# Patient Record
Sex: FEMALE | Race: OTHER | HISPANIC OR LATINO
[De-identification: names, ages, dates, MRNs, and addresses within clinical notes are randomized per-mention and may not be internally consistent; named-entity substitution may affect disease eponyms.]

---

## 2020-03-03 ENCOUNTER — APPOINTMENT (OUTPATIENT)
Dept: ENDOCRINOLOGY | Facility: CLINIC | Age: 70
End: 2020-03-03
Payer: MEDICAID

## 2020-03-03 ENCOUNTER — RESULT CHARGE (OUTPATIENT)
Age: 70
End: 2020-03-03

## 2020-03-03 VITALS — HEIGHT: 62 IN | BODY MASS INDEX: 33.13 KG/M2 | WEIGHT: 180 LBS

## 2020-03-03 DIAGNOSIS — E11.9 TYPE 2 DIABETES MELLITUS W/OUT COMPLICATIONS: ICD-10-CM

## 2020-03-03 LAB — HBA1C MFR BLD HPLC: 9.3

## 2020-03-03 PROCEDURE — 95251 CONT GLUC MNTR ANALYSIS I&R: CPT

## 2020-03-03 PROCEDURE — 95250 CONT GLUC MNTR PHYS/QHP EQP: CPT

## 2020-03-03 PROCEDURE — G0108 DIAB MANAGE TRN  PER INDIV: CPT

## 2020-03-03 RX ORDER — ELECTROLYTES/DEXTROSE
32G X 4 MM SOLUTION, ORAL ORAL
Qty: 2 | Refills: 0 | Status: ACTIVE | COMMUNITY
Start: 2020-03-03 | End: 1900-01-01

## 2020-05-12 ENCOUNTER — APPOINTMENT (OUTPATIENT)
Dept: ENDOCRINOLOGY | Facility: CLINIC | Age: 70
End: 2020-05-12

## 2020-08-04 RX ORDER — INSULIN ASPART 100 [IU]/ML
100 INJECTION, SOLUTION INTRAVENOUS; SUBCUTANEOUS
Qty: 3 | Refills: 0 | Status: ACTIVE | COMMUNITY
Start: 2020-03-03 | End: 1900-01-01

## 2023-10-21 ENCOUNTER — EMERGENCY (EMERGENCY)
Facility: HOSPITAL | Age: 73
LOS: 1 days | Discharge: HOME | End: 2023-10-21
Attending: EMERGENCY MEDICINE | Admitting: EMERGENCY MEDICINE
Payer: MEDICARE

## 2023-10-21 VITALS
HEART RATE: 78 BPM | SYSTOLIC BLOOD PRESSURE: 137 MMHG | OXYGEN SATURATION: 98 % | RESPIRATION RATE: 14 BRPM | TEMPERATURE: 98 F | DIASTOLIC BLOOD PRESSURE: 56 MMHG

## 2023-10-21 VITALS
TEMPERATURE: 98 F | SYSTOLIC BLOOD PRESSURE: 128 MMHG | OXYGEN SATURATION: 98 % | RESPIRATION RATE: 18 BRPM | DIASTOLIC BLOOD PRESSURE: 54 MMHG | HEART RATE: 80 BPM

## 2023-10-21 LAB
ALBUMIN SERPL ELPH-MCNC: 4.1 G/DL — SIGNIFICANT CHANGE UP (ref 3.3–5)
ALBUMIN SERPL ELPH-MCNC: 4.1 G/DL — SIGNIFICANT CHANGE UP (ref 3.3–5)
ALP SERPL-CCNC: 76 U/L — SIGNIFICANT CHANGE UP (ref 40–120)
ALP SERPL-CCNC: 76 U/L — SIGNIFICANT CHANGE UP (ref 40–120)
ALT FLD-CCNC: 40 U/L — HIGH (ref 4–33)
ALT FLD-CCNC: 40 U/L — HIGH (ref 4–33)
ANION GAP SERPL CALC-SCNC: 16 MMOL/L — HIGH (ref 7–14)
ANION GAP SERPL CALC-SCNC: 16 MMOL/L — HIGH (ref 7–14)
APPEARANCE UR: CLEAR — SIGNIFICANT CHANGE UP
APPEARANCE UR: CLEAR — SIGNIFICANT CHANGE UP
AST SERPL-CCNC: 50 U/L — HIGH (ref 4–32)
AST SERPL-CCNC: 50 U/L — HIGH (ref 4–32)
B PERT DNA SPEC QL NAA+PROBE: SIGNIFICANT CHANGE UP
B PERT DNA SPEC QL NAA+PROBE: SIGNIFICANT CHANGE UP
B PERT+PARAPERT DNA PNL SPEC NAA+PROBE: SIGNIFICANT CHANGE UP
B PERT+PARAPERT DNA PNL SPEC NAA+PROBE: SIGNIFICANT CHANGE UP
BACTERIA # UR AUTO: NEGATIVE /HPF — SIGNIFICANT CHANGE UP
BACTERIA # UR AUTO: NEGATIVE /HPF — SIGNIFICANT CHANGE UP
BASOPHILS # BLD AUTO: 0.04 K/UL — SIGNIFICANT CHANGE UP (ref 0–0.2)
BASOPHILS # BLD AUTO: 0.04 K/UL — SIGNIFICANT CHANGE UP (ref 0–0.2)
BASOPHILS NFR BLD AUTO: 0.6 % — SIGNIFICANT CHANGE UP (ref 0–2)
BASOPHILS NFR BLD AUTO: 0.6 % — SIGNIFICANT CHANGE UP (ref 0–2)
BILIRUB SERPL-MCNC: 0.3 MG/DL — SIGNIFICANT CHANGE UP (ref 0.2–1.2)
BILIRUB SERPL-MCNC: 0.3 MG/DL — SIGNIFICANT CHANGE UP (ref 0.2–1.2)
BILIRUB UR-MCNC: NEGATIVE — SIGNIFICANT CHANGE UP
BILIRUB UR-MCNC: NEGATIVE — SIGNIFICANT CHANGE UP
BORDETELLA PARAPERTUSSIS (RAPRVP): SIGNIFICANT CHANGE UP
BORDETELLA PARAPERTUSSIS (RAPRVP): SIGNIFICANT CHANGE UP
BUN SERPL-MCNC: 17 MG/DL — SIGNIFICANT CHANGE UP (ref 7–23)
BUN SERPL-MCNC: 17 MG/DL — SIGNIFICANT CHANGE UP (ref 7–23)
C PNEUM DNA SPEC QL NAA+PROBE: SIGNIFICANT CHANGE UP
C PNEUM DNA SPEC QL NAA+PROBE: SIGNIFICANT CHANGE UP
CALCIUM SERPL-MCNC: 10 MG/DL — SIGNIFICANT CHANGE UP (ref 8.4–10.5)
CALCIUM SERPL-MCNC: 10 MG/DL — SIGNIFICANT CHANGE UP (ref 8.4–10.5)
CAST: 5 /LPF — HIGH (ref 0–4)
CAST: 5 /LPF — HIGH (ref 0–4)
CHLORIDE SERPL-SCNC: 100 MMOL/L — SIGNIFICANT CHANGE UP (ref 98–107)
CHLORIDE SERPL-SCNC: 100 MMOL/L — SIGNIFICANT CHANGE UP (ref 98–107)
CO2 SERPL-SCNC: 23 MMOL/L — SIGNIFICANT CHANGE UP (ref 22–31)
CO2 SERPL-SCNC: 23 MMOL/L — SIGNIFICANT CHANGE UP (ref 22–31)
COLOR SPEC: YELLOW — SIGNIFICANT CHANGE UP
COLOR SPEC: YELLOW — SIGNIFICANT CHANGE UP
CREAT SERPL-MCNC: 0.64 MG/DL — SIGNIFICANT CHANGE UP (ref 0.5–1.3)
CREAT SERPL-MCNC: 0.64 MG/DL — SIGNIFICANT CHANGE UP (ref 0.5–1.3)
DIFF PNL FLD: NEGATIVE — SIGNIFICANT CHANGE UP
DIFF PNL FLD: NEGATIVE — SIGNIFICANT CHANGE UP
EGFR: 92 ML/MIN/1.73M2 — SIGNIFICANT CHANGE UP
EGFR: 92 ML/MIN/1.73M2 — SIGNIFICANT CHANGE UP
EOSINOPHIL # BLD AUTO: 0.26 K/UL — SIGNIFICANT CHANGE UP (ref 0–0.5)
EOSINOPHIL # BLD AUTO: 0.26 K/UL — SIGNIFICANT CHANGE UP (ref 0–0.5)
EOSINOPHIL NFR BLD AUTO: 4 % — SIGNIFICANT CHANGE UP (ref 0–6)
EOSINOPHIL NFR BLD AUTO: 4 % — SIGNIFICANT CHANGE UP (ref 0–6)
FLUAV SUBTYP SPEC NAA+PROBE: SIGNIFICANT CHANGE UP
FLUAV SUBTYP SPEC NAA+PROBE: SIGNIFICANT CHANGE UP
FLUBV RNA SPEC QL NAA+PROBE: SIGNIFICANT CHANGE UP
FLUBV RNA SPEC QL NAA+PROBE: SIGNIFICANT CHANGE UP
GLUCOSE SERPL-MCNC: 190 MG/DL — HIGH (ref 70–99)
GLUCOSE SERPL-MCNC: 190 MG/DL — HIGH (ref 70–99)
GLUCOSE UR QL: NEGATIVE MG/DL — SIGNIFICANT CHANGE UP
GLUCOSE UR QL: NEGATIVE MG/DL — SIGNIFICANT CHANGE UP
HADV DNA SPEC QL NAA+PROBE: SIGNIFICANT CHANGE UP
HADV DNA SPEC QL NAA+PROBE: SIGNIFICANT CHANGE UP
HCOV 229E RNA SPEC QL NAA+PROBE: SIGNIFICANT CHANGE UP
HCOV 229E RNA SPEC QL NAA+PROBE: SIGNIFICANT CHANGE UP
HCOV HKU1 RNA SPEC QL NAA+PROBE: SIGNIFICANT CHANGE UP
HCOV HKU1 RNA SPEC QL NAA+PROBE: SIGNIFICANT CHANGE UP
HCOV NL63 RNA SPEC QL NAA+PROBE: SIGNIFICANT CHANGE UP
HCOV NL63 RNA SPEC QL NAA+PROBE: SIGNIFICANT CHANGE UP
HCOV OC43 RNA SPEC QL NAA+PROBE: SIGNIFICANT CHANGE UP
HCOV OC43 RNA SPEC QL NAA+PROBE: SIGNIFICANT CHANGE UP
HCT VFR BLD CALC: 32.5 % — LOW (ref 34.5–45)
HCT VFR BLD CALC: 32.5 % — LOW (ref 34.5–45)
HGB BLD-MCNC: 9.9 G/DL — LOW (ref 11.5–15.5)
HGB BLD-MCNC: 9.9 G/DL — LOW (ref 11.5–15.5)
HMPV RNA SPEC QL NAA+PROBE: SIGNIFICANT CHANGE UP
HMPV RNA SPEC QL NAA+PROBE: SIGNIFICANT CHANGE UP
HPIV1 RNA SPEC QL NAA+PROBE: SIGNIFICANT CHANGE UP
HPIV1 RNA SPEC QL NAA+PROBE: SIGNIFICANT CHANGE UP
HPIV2 RNA SPEC QL NAA+PROBE: SIGNIFICANT CHANGE UP
HPIV2 RNA SPEC QL NAA+PROBE: SIGNIFICANT CHANGE UP
HPIV3 RNA SPEC QL NAA+PROBE: SIGNIFICANT CHANGE UP
HPIV3 RNA SPEC QL NAA+PROBE: SIGNIFICANT CHANGE UP
HPIV4 RNA SPEC QL NAA+PROBE: SIGNIFICANT CHANGE UP
HPIV4 RNA SPEC QL NAA+PROBE: SIGNIFICANT CHANGE UP
IANC: 3.31 K/UL — SIGNIFICANT CHANGE UP (ref 1.8–7.4)
IANC: 3.31 K/UL — SIGNIFICANT CHANGE UP (ref 1.8–7.4)
IMM GRANULOCYTES NFR BLD AUTO: 1.2 % — HIGH (ref 0–0.9)
IMM GRANULOCYTES NFR BLD AUTO: 1.2 % — HIGH (ref 0–0.9)
KETONES UR-MCNC: NEGATIVE MG/DL — SIGNIFICANT CHANGE UP
KETONES UR-MCNC: NEGATIVE MG/DL — SIGNIFICANT CHANGE UP
LEUKOCYTE ESTERASE UR-ACNC: ABNORMAL
LEUKOCYTE ESTERASE UR-ACNC: ABNORMAL
LYMPHOCYTES # BLD AUTO: 2.36 K/UL — SIGNIFICANT CHANGE UP (ref 1–3.3)
LYMPHOCYTES # BLD AUTO: 2.36 K/UL — SIGNIFICANT CHANGE UP (ref 1–3.3)
LYMPHOCYTES # BLD AUTO: 35.9 % — SIGNIFICANT CHANGE UP (ref 13–44)
LYMPHOCYTES # BLD AUTO: 35.9 % — SIGNIFICANT CHANGE UP (ref 13–44)
M PNEUMO DNA SPEC QL NAA+PROBE: SIGNIFICANT CHANGE UP
M PNEUMO DNA SPEC QL NAA+PROBE: SIGNIFICANT CHANGE UP
MCHC RBC-ENTMCNC: 23.1 PG — LOW (ref 27–34)
MCHC RBC-ENTMCNC: 23.1 PG — LOW (ref 27–34)
MCHC RBC-ENTMCNC: 30.5 GM/DL — LOW (ref 32–36)
MCHC RBC-ENTMCNC: 30.5 GM/DL — LOW (ref 32–36)
MCV RBC AUTO: 75.9 FL — LOW (ref 80–100)
MCV RBC AUTO: 75.9 FL — LOW (ref 80–100)
MONOCYTES # BLD AUTO: 0.52 K/UL — SIGNIFICANT CHANGE UP (ref 0–0.9)
MONOCYTES # BLD AUTO: 0.52 K/UL — SIGNIFICANT CHANGE UP (ref 0–0.9)
MONOCYTES NFR BLD AUTO: 7.9 % — SIGNIFICANT CHANGE UP (ref 2–14)
MONOCYTES NFR BLD AUTO: 7.9 % — SIGNIFICANT CHANGE UP (ref 2–14)
NEUTROPHILS # BLD AUTO: 3.31 K/UL — SIGNIFICANT CHANGE UP (ref 1.8–7.4)
NEUTROPHILS # BLD AUTO: 3.31 K/UL — SIGNIFICANT CHANGE UP (ref 1.8–7.4)
NEUTROPHILS NFR BLD AUTO: 50.4 % — SIGNIFICANT CHANGE UP (ref 43–77)
NEUTROPHILS NFR BLD AUTO: 50.4 % — SIGNIFICANT CHANGE UP (ref 43–77)
NITRITE UR-MCNC: NEGATIVE — SIGNIFICANT CHANGE UP
NITRITE UR-MCNC: NEGATIVE — SIGNIFICANT CHANGE UP
NRBC # BLD: 0 /100 WBCS — SIGNIFICANT CHANGE UP (ref 0–0)
NRBC # BLD: 0 /100 WBCS — SIGNIFICANT CHANGE UP (ref 0–0)
NRBC # FLD: 0 K/UL — SIGNIFICANT CHANGE UP (ref 0–0)
NRBC # FLD: 0 K/UL — SIGNIFICANT CHANGE UP (ref 0–0)
PH UR: 6.5 — SIGNIFICANT CHANGE UP (ref 5–8)
PH UR: 6.5 — SIGNIFICANT CHANGE UP (ref 5–8)
PLATELET # BLD AUTO: 230 K/UL — SIGNIFICANT CHANGE UP (ref 150–400)
PLATELET # BLD AUTO: 230 K/UL — SIGNIFICANT CHANGE UP (ref 150–400)
POTASSIUM SERPL-MCNC: 4.3 MMOL/L — SIGNIFICANT CHANGE UP (ref 3.5–5.3)
POTASSIUM SERPL-MCNC: 4.3 MMOL/L — SIGNIFICANT CHANGE UP (ref 3.5–5.3)
POTASSIUM SERPL-SCNC: 4.3 MMOL/L — SIGNIFICANT CHANGE UP (ref 3.5–5.3)
POTASSIUM SERPL-SCNC: 4.3 MMOL/L — SIGNIFICANT CHANGE UP (ref 3.5–5.3)
PROT SERPL-MCNC: 7.7 G/DL — SIGNIFICANT CHANGE UP (ref 6–8.3)
PROT SERPL-MCNC: 7.7 G/DL — SIGNIFICANT CHANGE UP (ref 6–8.3)
PROT UR-MCNC: NEGATIVE MG/DL — SIGNIFICANT CHANGE UP
PROT UR-MCNC: NEGATIVE MG/DL — SIGNIFICANT CHANGE UP
RAPID RVP RESULT: SIGNIFICANT CHANGE UP
RAPID RVP RESULT: SIGNIFICANT CHANGE UP
RBC # BLD: 4.28 M/UL — SIGNIFICANT CHANGE UP (ref 3.8–5.2)
RBC # BLD: 4.28 M/UL — SIGNIFICANT CHANGE UP (ref 3.8–5.2)
RBC # FLD: 18.4 % — HIGH (ref 10.3–14.5)
RBC # FLD: 18.4 % — HIGH (ref 10.3–14.5)
RBC CASTS # UR COMP ASSIST: 0 /HPF — SIGNIFICANT CHANGE UP (ref 0–4)
RBC CASTS # UR COMP ASSIST: 0 /HPF — SIGNIFICANT CHANGE UP (ref 0–4)
RSV RNA SPEC QL NAA+PROBE: SIGNIFICANT CHANGE UP
RSV RNA SPEC QL NAA+PROBE: SIGNIFICANT CHANGE UP
RV+EV RNA SPEC QL NAA+PROBE: SIGNIFICANT CHANGE UP
RV+EV RNA SPEC QL NAA+PROBE: SIGNIFICANT CHANGE UP
SARS-COV-2 RNA SPEC QL NAA+PROBE: SIGNIFICANT CHANGE UP
SARS-COV-2 RNA SPEC QL NAA+PROBE: SIGNIFICANT CHANGE UP
SODIUM SERPL-SCNC: 139 MMOL/L — SIGNIFICANT CHANGE UP (ref 135–145)
SODIUM SERPL-SCNC: 139 MMOL/L — SIGNIFICANT CHANGE UP (ref 135–145)
SP GR SPEC: 1.01 — SIGNIFICANT CHANGE UP (ref 1–1.03)
SP GR SPEC: 1.01 — SIGNIFICANT CHANGE UP (ref 1–1.03)
SQUAMOUS # UR AUTO: 1 /HPF — SIGNIFICANT CHANGE UP (ref 0–5)
SQUAMOUS # UR AUTO: 1 /HPF — SIGNIFICANT CHANGE UP (ref 0–5)
UROBILINOGEN FLD QL: 0.2 MG/DL — SIGNIFICANT CHANGE UP (ref 0.2–1)
UROBILINOGEN FLD QL: 0.2 MG/DL — SIGNIFICANT CHANGE UP (ref 0.2–1)
WBC # BLD: 6.57 K/UL — SIGNIFICANT CHANGE UP (ref 3.8–10.5)
WBC # BLD: 6.57 K/UL — SIGNIFICANT CHANGE UP (ref 3.8–10.5)
WBC # FLD AUTO: 6.57 K/UL — SIGNIFICANT CHANGE UP (ref 3.8–10.5)
WBC # FLD AUTO: 6.57 K/UL — SIGNIFICANT CHANGE UP (ref 3.8–10.5)
WBC UR QL: 2 /HPF — SIGNIFICANT CHANGE UP (ref 0–5)
WBC UR QL: 2 /HPF — SIGNIFICANT CHANGE UP (ref 0–5)

## 2023-10-21 PROCEDURE — 93010 ELECTROCARDIOGRAM REPORT: CPT

## 2023-10-21 PROCEDURE — 71046 X-RAY EXAM CHEST 2 VIEWS: CPT | Mod: 26

## 2023-10-21 PROCEDURE — 99284 EMERGENCY DEPT VISIT MOD MDM: CPT | Mod: GC

## 2023-10-21 RX ORDER — IPRATROPIUM/ALBUTEROL SULFATE 18-103MCG
3 AEROSOL WITH ADAPTER (GRAM) INHALATION
Refills: 0 | Status: DISCONTINUED | OUTPATIENT
Start: 2023-10-21 | End: 2023-10-25

## 2023-10-21 RX ADMIN — Medication 100 MILLIGRAM(S): at 15:30

## 2023-10-21 RX ADMIN — Medication 3 MILLILITER(S): at 15:50

## 2023-10-21 NOTE — ED PROVIDER NOTE - NSICDXPASTSURGICALHX_GEN_ALL_CORE_FT
PAST SURGICAL HISTORY:  Other postprocedural status umbilical surgery    Other postprocedural status for treatment of diverticulitis    Status post total hysterectomy at 19 years old

## 2023-10-21 NOTE — ED PROVIDER NOTE - CLINICAL SUMMARY MEDICAL DECISION MAKING FREE TEXT BOX
73-year-old female history of diabetes, , HTN, HLD presenting to the ED for 10 days of cough and began feeling bilateral lower extremity weakness for 1 day but has been ambulating normally.  Noted she had fevers and chills last week but those have resolved and patient continues to have cough.  Denying any chest pain, shortness of breath, nausea, vomiting.  Denying any belly pain no dysuria.  No lower extremity swelling.   we will get chest x-ray to evaluate for pneumonia.  Also evaluate for electrolyte abnormalities versus UTI for patient's lower extremity weakness.

## 2023-10-21 NOTE — ED PROVIDER NOTE - NSICDXPASTMEDICALHX_GEN_ALL_CORE_FT
PAST MEDICAL HISTORY:  Atherosclerosis of coronary artery Coronary artery disease    Essential hypertension Hypertension    Hypercholesterolemia Hypercholesterolemia    Type 2 diabetes mellitus Diabetes

## 2023-10-21 NOTE — ED ADULT TRIAGE NOTE - CHIEF COMPLAINT QUOTE
Patient c/o a cough x 10 days and bilateral leg weakness since yesterday. diabetes type 2  FS = 202  Patient denies any fevers or chills.

## 2023-10-21 NOTE — ED ADULT TRIAGE NOTE - MODE OF ARRIVAL
Roxana from [employer] is here to reprogram pacemaker.  Patient to front lobby via ambulation with her daughter.   Walk in

## 2023-10-21 NOTE — ED PROVIDER NOTE - PROGRESS NOTE DETAILS
Flaquito Phillip, PGY3 Patient feels significant improvement after getting nebs and Tessalon Perles.  Labs show no acute findings.  Patient is well-appearing at bedside and would like to go home.  Will discharge on Tessalon Perles

## 2023-10-21 NOTE — ED ADULT NURSE NOTE - NSFALLUNIVINTERV_ED_ALL_ED
Bed/Stretcher in lowest position, wheels locked, appropriate side rails in place/Call bell, personal items and telephone in reach/Instruct patient to call for assistance before getting out of bed/chair/stretcher/Non-slip footwear applied when patient is off stretcher/[place] to call system/Physically safe environment - no spills, clutter or unnecessary equipment/Purposeful proactive rounding/Room/bathroom lighting operational, light cord in reach

## 2023-10-21 NOTE — ED PROVIDER NOTE - NSFOLLOWUPINSTRUCTIONS_ED_ALL_ED_FT
It was a pleasure caring for you today.  Please make sure to stay hydrated and get plenty of rest.  Please make sure to follow-up with your primary care doctor in the next 2 to 3 days for follow-up care.  Please return to the hospital if you have any new or worsening symptoms including chest pain, shortness of breath, dizziness, passing out.      You were sent a medication called Tessalon Perles to your pharmacy.  You may take this for cough.  Please make sure to not operate heavy machinery as medication as it can cause sleepiness or drowsiness.

## 2023-10-21 NOTE — ED PROVIDER NOTE - NSICDXFAMILYHX_GEN_ALL_CORE_FT
FAMILY HISTORY:  Family history of cardiovascular disease, alive; 64 years old  Family history of ischemic heart disease,  at 69 from MI

## 2023-10-21 NOTE — ED PROVIDER NOTE - PATIENT PORTAL LINK FT
You can access the FollowMyHealth Patient Portal offered by [facility] by registering at the following website: [email or web address]. By joining [facility]’s FollowMyHealth portal, you will also be able to view your health information using other applications (apps) compatible with our system.

## 2023-10-21 NOTE — ED PROVIDER NOTE - PHYSICAL EXAMINATION
Const: Well-nourished, Well-developed, appearing stated age.  Eyes: no conjunctival injection, and symmetrical lids.  HEENT: Head NCAT, no lesions. Atraumatic external nose and ears. Moist MM.  Neck: Symmetric, trachea midline.   CVS: +S1/S2, Peripheral pulses 2+ and equal in all extremities.  RESP: Unlabored respiratory effort. Left lower rales  GI: Nontender/Nondistended   MSK: Normocephalic/Atraumatic, Lower Extremities w/o calf tenderness or edema b/l.  patient ambulating in the ED bathroom  Skin: Warm, dry and intact.   Neuro: CNs II-XII grossly intact. Motor & Sensation grossly intact.  Psych: Awake, Alert, & Oriented (AAO) x3. Appropriate mood and affect.

## 2023-10-21 NOTE — ED ADULT NURSE REASSESSMENT NOTE - NS ED NURSE REASSESS COMMENT FT1
break coverage rn. pt A&Ox4, amb at baseline. endorses partial symptom improvement post receiving 2x duoneb treatments. states breathing is better, but cough slightly persisting. Awaiting xray results. safety maintained. resp even unlabored, no distress noted.

## 2023-10-21 NOTE — ED ADULT NURSE NOTE - OBJECTIVE STATEMENT
break coverage rn. pt A&Ox4, amb at baseline [place] speaking only. Pmh of HTN, HLD, DM2. Pt c/o chest discomfort r/t cough for 10x days. states approximately 1x week ago had fevers/chills, has since resolved. Son at bedside states pt feels like she has phlegm but unable to cough up anything. Has not seen PCP or urgent care for symptoms. Denies chest pain, n/v, headache, dizziness, diarrhea,n/v,numbness/tingling to hands/feet. 20G to IVANA, labs and urine sent, medicated awaiting xray

## 2023-10-21 NOTE — ED ADULT NURSE NOTE - PAIN: BODY LOCATION
What Type Of Note Output Would You Prefer (Optional)?: Standard Output How Severe Is Your Skin Lesion?: mild Has Your Skin Lesion Been Treated?: not been treated Is This A New Presentation, Or A Follow-Up?: Mole throat discomfort

## 2023-10-21 NOTE — ED PROVIDER NOTE - ATTENDING CONTRIBUTION TO CARE
73-year-old female history of diabetes, , HTN, HLD presenting to the ED for 10 days of cough and began feeling bilateral lower extremity weakness for 1 day but has been ambulating normally.  Noted she had fevers and chills last week but those have resolved and patient continues to have cough.  Denying any chest pain, shortness of breath, nausea, vomiting.  Denying any belly pain no dysuria.  No lower extremity swelling.

## 2023-10-21 NOTE — ED ADULT TRIAGE NOTE - AS TEMP SITE
SUBJECTIVE  Chief Complaint   Patient presents with    ADHD    Anxiety       HPI This child is with mom. This young lady has been thoroughly evaluated by her counselor and is thought to have anxiety related to ADHD. Both mom and child are interested in trying an ADHD medicine to see if it will help her focus and in turn help her anxiety. Review of Systems   Constitutional: Negative for appetite change, fatigue and fever. HENT: Negative for ear pain and sore throat. Eyes: Negative for discharge. Respiratory: Negative for cough. Gastrointestinal: Negative for abdominal pain, constipation, diarrhea, nausea and vomiting. Genitourinary: Negative for dysuria and urgency. Skin: Negative for rash. Neurological: Negative for headaches. Psychiatric/Behavioral: Positive for decreased concentration. Negative for behavioral problems. The patient is not hyperactive. All other systems reviewed and are negative. Past Medical History:   Diagnosis Date    Anxiety 6/11/2018    Dysthymia 9/21/2020    Early satiety 9/28/2020    Episode of recurrent major depressive disorder ([facility] [address] 8/5/2019    Family history of thyroid disease in mother 6/11/2018    Gastroesophageal reflux disease without esophagitis 10/15/2020    Motion sickness 6/11/2018       History reviewed. No pertinent family history. No Known Allergies    OBJECTIVE  Physical Exam  Constitutional:       Appearance: She is well-developed. HENT:      Nose: Nose normal.   Eyes:      Pupils: Pupils are equal, round, and reactive to light. Comments: Good red reflex   Neck:      Thyroid: No thyromegaly. Cardiovascular:      Rate and Rhythm: Normal rate. Heart sounds: Normal heart sounds. No murmur heard. Pulmonary:      Effort: Pulmonary effort is normal.      Breath sounds: Normal breath sounds. Abdominal:      General: Bowel sounds are normal.      Palpations: Abdomen is soft. There is no mass.    Musculoskeletal: Cervical back: Normal range of motion. Lymphadenopathy:      Cervical: No cervical adenopathy. Skin:     Findings: No rash. Neurological:      Mental Status: She is alert. Psychiatric:         Judgment: Judgment normal.         ASSESSMENT    ICD-10-CM    1. Attention deficit hyperactivity disorder (ADHD), predominantly inattentive type  F90.0 methylphenidate (CONCERTA) 18 MG extended release tablet        PLAN  After explaining side effects of stimulant medicine to include tic disorder, personality change, anorexia, headache, and paradoxical worsening of her focus we have elected to start her on Concerta 18 mg and I will recheck her 2 weeks    Emery Suazo MD    More than 50% of the time was spent counseling and coordinating care for a total time of greater than 20 min.     (Please note that portions of this note were completed with a voice recognition program.  Effortswere made to edit the dictations but occasionally words are mis-transcribed.) oral

## 2023-10-22 LAB
CULTURE RESULTS: SIGNIFICANT CHANGE UP
CULTURE RESULTS: SIGNIFICANT CHANGE UP
SPECIMEN SOURCE: SIGNIFICANT CHANGE UP
SPECIMEN SOURCE: SIGNIFICANT CHANGE UP

## 2024-03-16 ENCOUNTER — INPATIENT (INPATIENT)
Facility: HOSPITAL | Age: 74
LOS: 3 days | Discharge: HOME | End: 2024-03-20
Attending: INTERNAL MEDICINE | Admitting: INTERNAL MEDICINE
Payer: MEDICARE

## 2024-03-16 VITALS
DIASTOLIC BLOOD PRESSURE: 74 MMHG | RESPIRATION RATE: 22 BRPM | HEART RATE: 108 BPM | TEMPERATURE: 98 F | SYSTOLIC BLOOD PRESSURE: 146 MMHG | OXYGEN SATURATION: 97 %

## 2024-03-16 PROCEDURE — 99285 EMERGENCY DEPT VISIT HI MDM: CPT

## 2024-03-16 NOTE — ED ADULT TRIAGE NOTE - CHIEF COMPLAINT QUOTE
pt aox4, ambulatory with steady gait comes in for worsening cough and exertions sob over the last 3 weeks. pt was given inhaler with no relief. pt denies fevers, no chest pain. sent by PMD for CXR, pt also has script for CT scan. hx: NIDDM, HTN

## 2024-03-17 DIAGNOSIS — I10 ESSENTIAL (PRIMARY) HYPERTENSION: ICD-10-CM

## 2024-03-17 DIAGNOSIS — J10.1 INFLUENZA DUE TO OTHER IDENTIFIED INFLUENZA VIRUS WITH OTHER RESPIRATORY MANIFESTATIONS: ICD-10-CM

## 2024-03-17 DIAGNOSIS — E78.5 HYPERLIPIDEMIA, UNSPECIFIED: ICD-10-CM

## 2024-03-17 DIAGNOSIS — E11.9 TYPE 2 DIABETES MELLITUS WITHOUT COMPLICATIONS: ICD-10-CM

## 2024-03-17 DIAGNOSIS — Z29.9 ENCOUNTER FOR PROPHYLACTIC MEASURES, UNSPECIFIED: ICD-10-CM

## 2024-03-17 DIAGNOSIS — I48.91 UNSPECIFIED ATRIAL FIBRILLATION: ICD-10-CM

## 2024-03-17 LAB
A1C WITH ESTIMATED AVERAGE GLUCOSE RESULT: 7.1 % — HIGH (ref 4–5.6)
ALBUMIN SERPL ELPH-MCNC: 4 G/DL — SIGNIFICANT CHANGE UP (ref 3.3–5)
ALBUMIN SERPL ELPH-MCNC: 4.1 G/DL — SIGNIFICANT CHANGE UP (ref 3.3–5)
ALP SERPL-CCNC: 77 U/L — SIGNIFICANT CHANGE UP (ref 40–120)
ALP SERPL-CCNC: 78 U/L — SIGNIFICANT CHANGE UP (ref 40–120)
ALT FLD-CCNC: 38 U/L — HIGH (ref 4–33)
ALT FLD-CCNC: 43 U/L — HIGH (ref 4–33)
ANION GAP SERPL CALC-SCNC: 18 MMOL/L — HIGH (ref 7–14)
ANION GAP SERPL CALC-SCNC: 20 MMOL/L — HIGH (ref 7–14)
AST SERPL-CCNC: 30 U/L — SIGNIFICANT CHANGE UP (ref 4–32)
AST SERPL-CCNC: 44 U/L — HIGH (ref 4–32)
BASE EXCESS BLDV CALC-SCNC: -1 MMOL/L — SIGNIFICANT CHANGE UP (ref -2–3)
BASOPHILS # BLD AUTO: 0.03 K/UL — SIGNIFICANT CHANGE UP (ref 0–0.2)
BASOPHILS NFR BLD AUTO: 0.4 % — SIGNIFICANT CHANGE UP (ref 0–2)
BILIRUB SERPL-MCNC: 0.3 MG/DL — SIGNIFICANT CHANGE UP (ref 0.2–1.2)
BILIRUB SERPL-MCNC: 0.4 MG/DL — SIGNIFICANT CHANGE UP (ref 0.2–1.2)
BUN SERPL-MCNC: 15 MG/DL — SIGNIFICANT CHANGE UP (ref 7–23)
BUN SERPL-MCNC: 17 MG/DL — SIGNIFICANT CHANGE UP (ref 7–23)
CA-I SERPL-SCNC: 1.28 MMOL/L — SIGNIFICANT CHANGE UP (ref 1.15–1.33)
CALCIUM SERPL-MCNC: 9.6 MG/DL — SIGNIFICANT CHANGE UP (ref 8.4–10.5)
CALCIUM SERPL-MCNC: 9.8 MG/DL — SIGNIFICANT CHANGE UP (ref 8.4–10.5)
CHLORIDE BLDV-SCNC: 101 MMOL/L — SIGNIFICANT CHANGE UP (ref 96–108)
CHLORIDE SERPL-SCNC: 94 MMOL/L — LOW (ref 98–107)
CHLORIDE SERPL-SCNC: 98 MMOL/L — SIGNIFICANT CHANGE UP (ref 98–107)
CO2 BLDV-SCNC: 24.5 MMOL/L — SIGNIFICANT CHANGE UP (ref 22–26)
CO2 SERPL-SCNC: 19 MMOL/L — LOW (ref 22–31)
CO2 SERPL-SCNC: 22 MMOL/L — SIGNIFICANT CHANGE UP (ref 22–31)
CREAT SERPL-MCNC: 0.66 MG/DL — SIGNIFICANT CHANGE UP (ref 0.5–1.3)
CREAT SERPL-MCNC: 0.71 MG/DL — SIGNIFICANT CHANGE UP (ref 0.5–1.3)
EGFR: 88 ML/MIN/1.73M2 — SIGNIFICANT CHANGE UP
EGFR: 91 ML/MIN/1.73M2 — SIGNIFICANT CHANGE UP
EOSINOPHIL # BLD AUTO: 0.11 K/UL — SIGNIFICANT CHANGE UP (ref 0–0.5)
EOSINOPHIL NFR BLD AUTO: 1.3 % — SIGNIFICANT CHANGE UP (ref 0–6)
ESTIMATED AVERAGE GLUCOSE: 157 — SIGNIFICANT CHANGE UP
FLUAV AG NPH QL: DETECTED
FLUBV AG NPH QL: SIGNIFICANT CHANGE UP
GAS PNL BLDV: 134 MMOL/L — LOW (ref 136–145)
GAS PNL BLDV: SIGNIFICANT CHANGE UP
GLUCOSE BLDV-MCNC: 110 MG/DL — HIGH (ref 70–99)
GLUCOSE SERPL-MCNC: 114 MG/DL — HIGH (ref 70–99)
GLUCOSE SERPL-MCNC: 260 MG/DL — HIGH (ref 70–99)
HCO3 BLDV-SCNC: 23 MMOL/L — SIGNIFICANT CHANGE UP (ref 22–29)
HCT VFR BLD CALC: 30 % — LOW (ref 34.5–45)
HCT VFR BLDA CALC: 16 % — CRITICAL LOW (ref 34.5–46.5)
HGB BLD CALC-MCNC: 5.3 G/DL — CRITICAL LOW (ref 11.7–16.1)
HGB BLD-MCNC: 9 G/DL — LOW (ref 11.5–15.5)
IANC: 4.25 K/UL — SIGNIFICANT CHANGE UP (ref 1.8–7.4)
IMM GRANULOCYTES NFR BLD AUTO: 0.6 % — SIGNIFICANT CHANGE UP (ref 0–0.9)
LACTATE BLDV-MCNC: 4.7 MMOL/L — CRITICAL HIGH (ref 0.5–2)
LACTATE SERPL-SCNC: 4.5 MMOL/L — CRITICAL HIGH (ref 0.5–2)
LYMPHOCYTES # BLD AUTO: 3.46 K/UL — HIGH (ref 1–3.3)
LYMPHOCYTES # BLD AUTO: 40.6 % — SIGNIFICANT CHANGE UP (ref 13–44)
MCHC RBC-ENTMCNC: 22.8 PG — LOW (ref 27–34)
MCHC RBC-ENTMCNC: 30 GM/DL — LOW (ref 32–36)
MCV RBC AUTO: 75.9 FL — LOW (ref 80–100)
MONOCYTES # BLD AUTO: 0.62 K/UL — SIGNIFICANT CHANGE UP (ref 0–0.9)
MONOCYTES NFR BLD AUTO: 7.3 % — SIGNIFICANT CHANGE UP (ref 2–14)
NEUTROPHILS # BLD AUTO: 4.25 K/UL — SIGNIFICANT CHANGE UP (ref 1.8–7.4)
NEUTROPHILS NFR BLD AUTO: 49.8 % — SIGNIFICANT CHANGE UP (ref 43–77)
NRBC # BLD: 0 /100 WBCS — SIGNIFICANT CHANGE UP (ref 0–0)
NRBC # FLD: 0 K/UL — SIGNIFICANT CHANGE UP (ref 0–0)
NT-PROBNP SERPL-SCNC: 361 PG/ML — HIGH
PCO2 BLDV: 36 MMHG — LOW (ref 39–52)
PH BLDV: 7.42 — SIGNIFICANT CHANGE UP (ref 7.32–7.43)
PLATELET # BLD AUTO: 223 K/UL — SIGNIFICANT CHANGE UP (ref 150–400)
PO2 BLDV: 115 MMHG — HIGH (ref 25–45)
POTASSIUM BLDV-SCNC: 4.1 MMOL/L — SIGNIFICANT CHANGE UP (ref 3.5–5.1)
POTASSIUM SERPL-MCNC: 3.9 MMOL/L — SIGNIFICANT CHANGE UP (ref 3.5–5.3)
POTASSIUM SERPL-MCNC: 4.4 MMOL/L — SIGNIFICANT CHANGE UP (ref 3.5–5.3)
POTASSIUM SERPL-SCNC: 3.9 MMOL/L — SIGNIFICANT CHANGE UP (ref 3.5–5.3)
POTASSIUM SERPL-SCNC: 4.4 MMOL/L — SIGNIFICANT CHANGE UP (ref 3.5–5.3)
PROCALCITONIN SERPL-MCNC: 0.09 NG/ML — SIGNIFICANT CHANGE UP (ref 0.02–0.1)
PROT SERPL-MCNC: 7.8 G/DL — SIGNIFICANT CHANGE UP (ref 6–8.3)
PROT SERPL-MCNC: 7.9 G/DL — SIGNIFICANT CHANGE UP (ref 6–8.3)
RBC # BLD: 3.95 M/UL — SIGNIFICANT CHANGE UP (ref 3.8–5.2)
RBC # FLD: 18.9 % — HIGH (ref 10.3–14.5)
RSV RNA NPH QL NAA+NON-PROBE: SIGNIFICANT CHANGE UP
SAO2 % BLDV: 97.4 % — HIGH (ref 67–88)
SARS-COV-2 RNA SPEC QL NAA+PROBE: SIGNIFICANT CHANGE UP
SODIUM SERPL-SCNC: 133 MMOL/L — LOW (ref 135–145)
SODIUM SERPL-SCNC: 138 MMOL/L — SIGNIFICANT CHANGE UP (ref 135–145)
WBC # BLD: 8.52 K/UL — SIGNIFICANT CHANGE UP (ref 3.8–10.5)
WBC # FLD AUTO: 8.52 K/UL — SIGNIFICANT CHANGE UP (ref 3.8–10.5)

## 2024-03-17 PROCEDURE — 99223 1ST HOSP IP/OBS HIGH 75: CPT

## 2024-03-17 PROCEDURE — 99284 EMERGENCY DEPT VISIT MOD MDM: CPT

## 2024-03-17 PROCEDURE — 71046 X-RAY EXAM CHEST 2 VIEWS: CPT | Mod: 26

## 2024-03-17 PROCEDURE — 71275 CT ANGIOGRAPHY CHEST: CPT | Mod: 26,MC

## 2024-03-17 RX ORDER — SIMVASTATIN 20 MG/1
20 TABLET, FILM COATED ORAL AT BEDTIME
Refills: 0 | Status: DISCONTINUED | OUTPATIENT
Start: 2024-03-17 | End: 2024-03-20

## 2024-03-17 RX ORDER — SODIUM CHLORIDE 9 MG/ML
1000 INJECTION, SOLUTION INTRAVENOUS
Refills: 0 | Status: DISCONTINUED | OUTPATIENT
Start: 2024-03-17 | End: 2024-03-20

## 2024-03-17 RX ORDER — DEXTROSE 50 % IN WATER 50 %
12.5 SYRINGE (ML) INTRAVENOUS ONCE
Refills: 0 | Status: DISCONTINUED | OUTPATIENT
Start: 2024-03-17 | End: 2024-03-20

## 2024-03-17 RX ORDER — DEXTROSE 50 % IN WATER 50 %
15 SYRINGE (ML) INTRAVENOUS ONCE
Refills: 0 | Status: DISCONTINUED | OUTPATIENT
Start: 2024-03-17 | End: 2024-03-20

## 2024-03-17 RX ORDER — SODIUM CHLORIDE 9 MG/ML
1000 INJECTION INTRAMUSCULAR; INTRAVENOUS; SUBCUTANEOUS ONCE
Refills: 0 | Status: COMPLETED | OUTPATIENT
Start: 2024-03-17 | End: 2024-03-17

## 2024-03-17 RX ORDER — ASPIRIN/CALCIUM CARB/MAGNESIUM 324 MG
0 TABLET ORAL
Refills: 0 | DISCHARGE

## 2024-03-17 RX ORDER — DIPHENHYDRAMINE HCL 50 MG
50 CAPSULE ORAL ONCE
Refills: 0 | Status: COMPLETED | OUTPATIENT
Start: 2024-03-17 | End: 2024-03-17

## 2024-03-17 RX ORDER — INSULIN LISPRO 100/ML
VIAL (ML) SUBCUTANEOUS AT BEDTIME
Refills: 0 | Status: DISCONTINUED | OUTPATIENT
Start: 2024-03-17 | End: 2024-03-20

## 2024-03-17 RX ORDER — DILTIAZEM HCL 120 MG
20 CAPSULE, EXT RELEASE 24 HR ORAL ONCE
Refills: 0 | Status: COMPLETED | OUTPATIENT
Start: 2024-03-17 | End: 2024-03-17

## 2024-03-17 RX ORDER — OMEPRAZOLE 10 MG/1
1 CAPSULE, DELAYED RELEASE ORAL
Refills: 0 | DISCHARGE

## 2024-03-17 RX ORDER — INSULIN GLARGINE 100 [IU]/ML
50 INJECTION, SOLUTION SUBCUTANEOUS
Refills: 0 | DISCHARGE

## 2024-03-17 RX ORDER — METOPROLOL TARTRATE 50 MG
25 TABLET ORAL
Refills: 0 | Status: DISCONTINUED | OUTPATIENT
Start: 2024-03-17 | End: 2024-03-20

## 2024-03-17 RX ORDER — INSULIN LISPRO 100/ML
VIAL (ML) SUBCUTANEOUS
Refills: 0 | Status: DISCONTINUED | OUTPATIENT
Start: 2024-03-17 | End: 2024-03-20

## 2024-03-17 RX ORDER — INSULIN LISPRO 100/ML
15 VIAL (ML) SUBCUTANEOUS
Refills: 0 | Status: DISCONTINUED | OUTPATIENT
Start: 2024-03-17 | End: 2024-03-20

## 2024-03-17 RX ORDER — INSULIN ASPART 100 [IU]/ML
20 INJECTION, SOLUTION SUBCUTANEOUS
Refills: 0 | DISCHARGE

## 2024-03-17 RX ORDER — DEXTROSE 50 % IN WATER 50 %
25 SYRINGE (ML) INTRAVENOUS ONCE
Refills: 0 | Status: DISCONTINUED | OUTPATIENT
Start: 2024-03-17 | End: 2024-03-20

## 2024-03-17 RX ORDER — PANTOPRAZOLE SODIUM 20 MG/1
40 TABLET, DELAYED RELEASE ORAL
Refills: 0 | Status: DISCONTINUED | OUTPATIENT
Start: 2024-03-17 | End: 2024-03-20

## 2024-03-17 RX ORDER — INSULIN GLARGINE 100 [IU]/ML
50 INJECTION, SOLUTION SUBCUTANEOUS AT BEDTIME
Refills: 0 | Status: DISCONTINUED | OUTPATIENT
Start: 2024-03-17 | End: 2024-03-20

## 2024-03-17 RX ORDER — APIXABAN 2.5 MG/1
5 TABLET, FILM COATED ORAL EVERY 12 HOURS
Refills: 0 | Status: DISCONTINUED | OUTPATIENT
Start: 2024-03-17 | End: 2024-03-20

## 2024-03-17 RX ORDER — ISOSORBIDE MONONITRATE 60 MG/1
1 TABLET, EXTENDED RELEASE ORAL
Refills: 0 | DISCHARGE

## 2024-03-17 RX ORDER — ASPIRIN/CALCIUM CARB/MAGNESIUM 324 MG
81 TABLET ORAL DAILY
Refills: 0 | Status: DISCONTINUED | OUTPATIENT
Start: 2024-03-17 | End: 2024-03-20

## 2024-03-17 RX ORDER — APIXABAN 2.5 MG/1
5 TABLET, FILM COATED ORAL ONCE
Refills: 0 | Status: COMPLETED | OUTPATIENT
Start: 2024-03-17 | End: 2024-03-17

## 2024-03-17 RX ORDER — SIMVASTATIN 20 MG/1
1 TABLET, FILM COATED ORAL
Refills: 0 | DISCHARGE

## 2024-03-17 RX ORDER — LOSARTAN POTASSIUM 100 MG/1
100 TABLET, FILM COATED ORAL DAILY
Refills: 0 | Status: DISCONTINUED | OUTPATIENT
Start: 2024-03-17 | End: 2024-03-20

## 2024-03-17 RX ORDER — DILTIAZEM HCL 120 MG
30 CAPSULE, EXT RELEASE 24 HR ORAL ONCE
Refills: 0 | Status: COMPLETED | OUTPATIENT
Start: 2024-03-17 | End: 2024-03-17

## 2024-03-17 RX ORDER — METFORMIN HYDROCHLORIDE 850 MG/1
1 TABLET ORAL
Refills: 0 | DISCHARGE

## 2024-03-17 RX ORDER — GLUCAGON INJECTION, SOLUTION 0.5 MG/.1ML
1 INJECTION, SOLUTION SUBCUTANEOUS ONCE
Refills: 0 | Status: DISCONTINUED | OUTPATIENT
Start: 2024-03-17 | End: 2024-03-20

## 2024-03-17 RX ORDER — LORATADINE 10 MG/1
1 TABLET ORAL
Refills: 0 | DISCHARGE

## 2024-03-17 RX ORDER — ISOSORBIDE MONONITRATE 60 MG/1
30 TABLET, EXTENDED RELEASE ORAL DAILY
Refills: 0 | Status: DISCONTINUED | OUTPATIENT
Start: 2024-03-17 | End: 2024-03-20

## 2024-03-17 RX ADMIN — Medication 200 MILLIGRAM(S): at 12:07

## 2024-03-17 RX ADMIN — Medication 15 UNIT(S): at 17:46

## 2024-03-17 RX ADMIN — Medication 1 TABLET(S): at 19:13

## 2024-03-17 RX ADMIN — Medication 2: at 17:46

## 2024-03-17 RX ADMIN — SIMVASTATIN 20 MILLIGRAM(S): 20 TABLET, FILM COATED ORAL at 22:16

## 2024-03-17 RX ADMIN — APIXABAN 5 MILLIGRAM(S): 2.5 TABLET, FILM COATED ORAL at 06:56

## 2024-03-17 RX ADMIN — SODIUM CHLORIDE 1000 MILLILITER(S): 9 INJECTION INTRAMUSCULAR; INTRAVENOUS; SUBCUTANEOUS at 03:05

## 2024-03-17 RX ADMIN — Medication 50 MILLIGRAM(S): at 04:28

## 2024-03-17 RX ADMIN — ISOSORBIDE MONONITRATE 30 MILLIGRAM(S): 60 TABLET, EXTENDED RELEASE ORAL at 12:06

## 2024-03-17 RX ADMIN — Medication 3: at 15:01

## 2024-03-17 RX ADMIN — Medication 25 MILLIGRAM(S): at 22:16

## 2024-03-17 RX ADMIN — Medication 100 MILLIGRAM(S): at 06:56

## 2024-03-17 RX ADMIN — Medication 20 MILLIGRAM(S): at 04:25

## 2024-03-17 RX ADMIN — LOSARTAN POTASSIUM 100 MILLIGRAM(S): 100 TABLET, FILM COATED ORAL at 12:07

## 2024-03-17 RX ADMIN — PANTOPRAZOLE SODIUM 40 MILLIGRAM(S): 20 TABLET, DELAYED RELEASE ORAL at 11:17

## 2024-03-17 RX ADMIN — Medication 81 MILLIGRAM(S): at 12:06

## 2024-03-17 RX ADMIN — Medication 75 MILLIGRAM(S): at 06:57

## 2024-03-17 RX ADMIN — APIXABAN 5 MILLIGRAM(S): 2.5 TABLET, FILM COATED ORAL at 19:13

## 2024-03-17 RX ADMIN — Medication 30 MILLIGRAM(S): at 05:26

## 2024-03-17 RX ADMIN — Medication 75 MILLIGRAM(S): at 19:13

## 2024-03-17 RX ADMIN — Medication 40 MILLIGRAM(S): at 01:11

## 2024-03-17 RX ADMIN — INSULIN GLARGINE 50 UNIT(S): 100 INJECTION, SOLUTION SUBCUTANEOUS at 22:16

## 2024-03-17 RX ADMIN — Medication 25 MILLIGRAM(S): at 11:16

## 2024-03-17 NOTE — ED PROVIDER NOTE - ADDITIONAL PROGRESS NOTE...
Additional Progress Note... High Dose Vitamin A Counseling: Side effects reviewed, pt to contact office should one occur.

## 2024-03-17 NOTE — ED ADULT NURSE REASSESSMENT NOTE - NS ED NURSE REASSESS COMMENT FT1
ER report taking from night shift, pt AOX 3 son at bed side, coming with non prod. cough, SOB, pt was Rapid A. Fib on triage EKG and con't on CM with Hr 120-131b/min, pt denies CP, no dizziness,   Tachypneic 22-23h/min, Oxygen Sat. 95-96 RA.  + Influenza A,   repeat CBC as per lab. was not found.      Susie Soriano RN

## 2024-03-17 NOTE — ED PROVIDER NOTE - CARE PLAN
1 Principal Discharge DX:	Cough  Secondary Diagnosis:	SOB (shortness of breath)   Principal Discharge DX:	Atrial fibrillation  Secondary Diagnosis:	SOB (shortness of breath)

## 2024-03-17 NOTE — H&P ADULT - PROBLEM SELECTOR PLAN 4
- Pt takes Lantus 50u + Novolog 20u  - Check A1C   - - Pt takes Lantus 50u + Novolog 20u at home   - Check A1C   - Hold metformin

## 2024-03-17 NOTE — H&P ADULT - PROBLEM SELECTOR PLAN 3
- Monitor BP   - Continue home mes Telmisartan, Amlodipine, HCTZ and Imdur. Adding Metoprolol as per cardiology recs

## 2024-03-17 NOTE — ED ADULT NURSE NOTE - NSFALLUNIVINTERV_ED_ALL_ED
Bed/Stretcher in lowest position, wheels locked, appropriate side rails in place/Call bell, personal items and telephone in reach/Instruct patient to call for assistance before getting out of bed/chair/stretcher/Non-slip footwear applied when patient is off stretcher/[place] to call system/Physically safe environment - no spills, clutter or unnecessary equipment/Purposeful proactive rounding/Room/bathroom lighting operational, light cord in reach

## 2024-03-17 NOTE — ED PROVIDER NOTE - PROGRESS NOTE DETAILS
Garry, PGY3 - Labs nonactionable, patient has influenza A.  Will wait for CTA, patient currently retirement through her premedication regimen. MD CHO:  Pt newly tachycardic.  -150, irreg irreg.  EKG demonstrates new rapid afib.  No cp, no sob, ctabil, no change to bp or mentation.  Will give ivf bolus, rate control with cardizem. Garry, PGY3 - Was alerted by nurse that patient's heart rate had jumped to the 150s, reassessed patient at bedside with  Maggie #[id number].  Patient states that she continues to feel the same intensity of symptoms as when she first arrived to the emergency room.  Repeat EKG shows A-fib with RVR, heart rate fluctuating between 120 and 150.  Did POCUS ultrasound which did not show pericardial effusion, concern for possible enlarged right ventricle.  Patient has received Benadryl, will get CAT scan in less than an hour. Patient had not received IV fluids that were ordered, current IV not working, will get a second IV and start fluids with Cardizem. Garry, PGY3 - Was alerted by nurse that patient's heart rate had jumped to the 150s, reassessed patient at bedside with  Maggie #[id number].  Patient states that she continues to feel the same intensity of symptoms as when she first arrived to the emergency room.  Repeat EKG shows A-fib with RVR, heart rate fluctuating between 120 and 150.  Did POCUS ultrasound which did not show pericardial effusion, concern for possible enlarged right ventricle.  Patient to receive Benadryl, will get CAT scan in less than an hour. Patient had not received IV fluids that were ordered, current IV not working, will get a second IV and start fluids with Cardizem. Garry, PGY3 - Repeat EKG shows improvement in heart rate, heart rate now usually around 120s.  Spoke to patient with  Zane #[id number], aware and in agreement with starting Eliquis and being admitted to the hospital.  Spoke to hospitalist Mariaa, excepting patient for A-fib with new RVR in the setting of flu a Garry, PGY3 - Spoke to cardio NP Raul, took down patient information, will let cards fellow know about consult for afib with rvr md cho:  elevated lactate level

## 2024-03-17 NOTE — ED PROVIDER NOTE - ATTENDING CONTRIBUTION TO CARE
DR. SANDOVAL, ATTENDING MD-  I performed a face to face bedside interview with the patient regarding history of present illness, review of symptoms and past medical history. I completed an independent physical exam.  I have discussed the patient's plan of care with the resident.   Documentation as above in the note.    75 y/o female c/o sob prod cough x3 wks.  Eval for pna viral syndrome pe anemia lyte abn.  Obtain ekg cbc cmp trop cxr ctpa viral swab reassess.

## 2024-03-17 NOTE — H&P ADULT - ASSESSMENT
73 yo F with h/o HTN, DM, HLD presenting with cough and shortness of breath x 2 weeks.  75 yo F with h/o HTN, DM, HLD presenting with cough and shortness of breath x 2 weeks. Found to be flu positive. Also complicated by A fib with RVR

## 2024-03-17 NOTE — ED PROCEDURE NOTE - ATTENDING CONTRIBUTION TO CARE
MD SANDOVAL:  I was present for the critical portions of this procedure and provided direct attending supervision.

## 2024-03-17 NOTE — ED PROVIDER NOTE - PHYSICAL EXAMINATION
Gen: mild distress, AOx3, able to make needs known, non-toxic  Head: NCAT  HEENT: EOMI, normal conjunctiva  Lung: CTAB, mild respiratory distress, 100% on RA, no wheezes/rhonchi/rales B/L, speaking in full sentences  CV: RRR, no M/R/G, pulses bilaterally. Equal bilateral lower extremities  Abd: soft, NTND, no guarding, no CVA tenderness  MSK: no visible bony deformities  Neuro: No focal sensory or motor deficits  Skin: Warm, well perfused, no rash  Psych: uncomfortable affect

## 2024-03-17 NOTE — ED ADULT NURSE NOTE - OBJECTIVE STATEMENT
74 year old female brought to room 13. Patient alert and oriented times four. Patient ambulatory at baseline. patients son at bedside translating for patient. pt aox4, ambulatory with steady gait comes in for worsening cough and exertions sob over the last 3 weeks. pt was given inhaler with no relief. pt denies fevers, no chest pain. sent by PMD for CXR, pt also has script for CT scan. hx: NIDDM, HTN. MD at bedside completing eval. awaiting further orders   patient laying in semi fowlers position on the stretcher. Patient normal sinus on the monitor. pulse oximetry at 98 percent. Respirations equal and adequate. Safety measures in place, call bell within reach. Patient stable upon leaving the room.

## 2024-03-17 NOTE — CONSULT NOTE ADULT - SUBJECTIVE AND OBJECTIVE BOX
Patient seen and evaluated at bedside    Chief Complaint: cough    HPI:  75 y/o female with PMH HTN, IDDM, HLD, GERD, CAD (s/p 5 stents, most recent a few years ago, only on ASA now), presenting with worsening cough.     Cough started about 3 weeks ago, improved slightly after 2 weeks, and then acutely worsened on 3/16 overnight which prompted her to come to ED. The cough is productive of light-colored sputum. She has some mild SOB while she is coughing, but otherwise no SOB at rest. No CP. No sensation of palpitations. No other major symptoms.    Med list: Insulin, metformin, PPI, ASA 81, amlodipine 5, telmisartan 80, HCTZ 12.5, simvastatin 20, Imdur 30. Atenolol was recently stopped by outpatient cardiologist at [facility].     Cardiology consulted for AF with RVR. AF appears new, although her records are not here, but her medication list does not include AC.    Patient appears well, comfortable, speaking full sentences, mild cough during exam. Denies CP or SOB.    EKG shows AF with RVR to 130s, iRBBB, LAFB, and borderline LVH by AVL criteria.  Pro-BNP 300s, CE not ordered. Lactate 4.7 --> 5.3. Flu positive. VBG normal. Hgb 9 (similar to prior). WBC 8.5. Cr 0.66.  CXR shows b/l prominent bronchovascular marking c/w pulmonary vascular congestion.  CTA PE shows no PE, and possibly mild pulmonary edema vs. fibrotic process    No TTE in the system since .     She was started on Eliquis and so far received 20mg IV dilt + 30mg PO dilt, as well as 40mg IV methylpred and Tamiflu.      PMHx:   Atherosclerosis of coronary artery  Type 2 diabetes mellitus  Hypercholesterolemia  Essential hypertension    PSHx:   Other postprocedural status  Other postprocedural status  Status post total hysterectomy    Allergies:  IV Contrast (Other)      FAMILY HISTORY:  Family history of cardiovascular disease  alive; 64 years old    Family history of ischemic heart disease   at 69 from MI    REVIEW OF SYSTEMS:   All other review of systems is negative unless indicated above.    Physical Exam:  T(F): 98.8 (-17), Max: 98.8 (-17)  HR: 120 (-17) (101 - 162)  BP: 140/72 (03-17) (140/70 - 164/64)  RR: 22 ()  SpO2: 96% ()  GENERAL: No acute distress, well-developed  CHEST/LUNG: b/l mild rales throughout, no wheeze, no crackles  HEART: Tachycardic, irregular; No murmurs, rubs, or gallops  ABDOMEN: Soft, Nontender, Nondistended  EXTREMITIES:  No edema  NEUROLOGY: AAOx3    CXR: Personally reviewed    Labs: Personally reviewed                        9.0    8.52  )-----------( 223      ( 17 Mar 2024 01:31 )             30.0         133<L>  |  94<L>  |  17  ----------------------------<  114<H>  3.9   |  19<L>  |  0.66    Ca    9.8      17 Mar 2024 01:31    TPro  7.8  /  Alb  4.0  /  TBili  0.3  /  DBili  x   /  AST  44<H>  /  ALT  43<H>  /  AlkPhos  77

## 2024-03-17 NOTE — ED ADULT NURSE REASSESSMENT NOTE - NS ED NURSE REASSESS COMMENT FT1
patient laying in semi fowlers position on the stretcher. patient alert and oriented times four. patient denies shortness of breath, chest pain, nausea, vomiting, chill, fever. Patient tachy sinus on the monitor. Respirations equal and adequate. Safety measures in place, call bell within reach. Patient stable upon assessment.

## 2024-03-17 NOTE — H&P ADULT - HISTORY OF PRESENT ILLNESS
73 yo F with h/o HTN, DM, HLD presenting with cough and shortness of breath x 2 weeks . Pt reports having a persistent cough that has gotten worse. Denies any fevers, no sick contacts or recent travel       In ED pt was given  73 yo F with h/o HTN, DM, HLD presenting with cough and shortness of breath x 2 weeks. Pt reports having a persistent cough that has gotten worse. Denies any fevers, no sick contacts or recent travel. Pt states she was given cough medicine and an inhaler by her pcp but this has not helped. In ED pt was found to be in A fib with RVR, HR in 130s. Denies h/o A fib. She denies any chest pain or palpitations, no orthopnea or leg swelling.     In ED pt was given Diltiazem, Eliquis, Imdur, Losartan, Solumedrol   VS:  129/68  131  98.1  18  95% on RA

## 2024-03-17 NOTE — ED ADULT NURSE REASSESSMENT NOTE - NS ED NURSE REASSESS COMMENT FT1
BREAK RN: Pt received from DEZ Kerr. Pt placed on cardiac monitor, HR up to 165 BPM. MD Adler at bedside for assessment. EKG completed showing afib with RVR. Pt tachypneic and SOB, O2  on room air. Respirations even, chest rise and fall equal b/l. Left wrist 20 placed, +blood return. Pt medicated as ordered. Pt pending CTA. Handoff given to primary DEZ Kerr. Pt safety maintained.

## 2024-03-17 NOTE — H&P ADULT - PROBLEM SELECTOR PLAN 1
- In setting of the flu  - CHADS VASC 6  - Cont Eliquis, metoprolol  - Monitor on tele   - TTE ordered - In setting of the flu  - CHADS VASC 6  - Cont Eliquis, metoprolol  - Monitor on tele   - TTE ordered  - Check TSH

## 2024-03-17 NOTE — ED PROVIDER NOTE - CLINICAL SUMMARY MEDICAL DECISION MAKING FREE TEXT BOX
Garry, PGY3 -  74-year-old woman presenting due to worsening cough, shortness of breath with exertion over the last 3 weeks. patient does not have PE risk factors.  Consider viral URI versus pneumonia versus anemia however does not have any blood losses.  Labs, chest x-ray, reassess.  Based on patient's tachycardia, tachypnea, chronicity of symptoms will consider CTA if cxr clear. *The above represents an initial assessment/impression. Please refer to progress notes for potential changes in patient clinical course*

## 2024-03-17 NOTE — ED PROVIDER NOTE - DATE/TIME 5
PAST SURGICAL HISTORY:  FH: cholecystectomy     History of necrotizing enterocolitis with intestinal resection     S/P partial hysterectomy      17-Mar-2024 07:01

## 2024-03-17 NOTE — H&P ADULT - NSHPPHYSICALEXAM_GEN_ALL_CORE
T(C): 37 (03-17-24 @ 15:03), Max: 37.1 (03-17-24 @ 07:44)  HR: 93 (03-17-24 @ 16:04) (86 - 162)  BP: 134/73 (03-17-24 @ 16:04) (115/73 - 164/64)  RR: 21 (03-17-24 @ 16:04) (18 - 25)  SpO2: 99% (03-17-24 @ 16:04) (95% - 99%)    GENERAL: No acute distress  HEAD:  Atraumatic, Normocephalic  ENT: EOMI, PERRLA, conjunctiva and sclera clear, Neck supple, No JVD, moist mucosa, no pharyngeal erythema, no tonsillar enlargement or exudate  CHEST/LUNG: Clear to auscultation bilaterally; No wheeze, equal breath sounds bilaterally   HEART: Regular rate and rhythm; No murmurs, rubs, or gallops  ABDOMEN: Soft, Nontender, Nondistended; Bowel sounds present, no organomegaly  EXTREMITIES:  2+ Peripheral Pulses, No clubbing, cyanosis, or edema  PSYCH: AAOx3, normal affect, normal behavior   NEUROLOGY: non-focal, cranial nerves intact  SKIN: Normal color, No rashes or lesions

## 2024-03-17 NOTE — H&P ADULT - NSHPLABSRESULTS_GEN_ALL_CORE
.  LABS:                         9.0    8.52  )-----------( 223      ( 17 Mar 2024 01:31 )             30.0     03-17    138  |  98  |  15  ----------------------------<  260<H>  4.4   |  22  |  0.71    Ca    9.6      17 Mar 2024 15:15    TPro  7.9  /  Alb  4.1  /  TBili  0.4  /  DBili  x   /  AST  30  /  ALT  38<H>  /  AlkPhos  78  03-17      Urinalysis Basic - ( 17 Mar 2024 15:15 )    Color: x / Appearance: x / SG: x / pH: x  Gluc: 260 mg/dL / Ketone: x  / Bili: x / Urobili: x   Blood: x / Protein: x / Nitrite: x   Leuk Esterase: x / RBC: x / WBC x   Sq Epi: x / Non Sq Epi: x / Bacteria: x      EKG reviewed personally: A fibe with RVR 133bpm      Lactate, Blood: 5.3 mmol/L (03-17 @ 06:45)      RADIOLOGY, EKG & ADDITIONAL TESTS: Reviewed.

## 2024-03-17 NOTE — H&P ADULT - NSHPREVIEWOFSYSTEMS_GEN_ALL_CORE
REVIEW OF SYSTEMS:    CONSTITUTIONAL: No weakness, fevers or chills, no weight loss  EYES/ENT: No visual changes;  No dysphagia or odynophagia, no tinnitus  NECK: No pain or stiffness  RESPIRATORY: + cough, no wheezing, hemoptysis; + shortness of breath  CARDIOVASCULAR: No chest pain or palpitations; No lower extremity edema  GASTROINTESTINAL: No abdominal or epigastric pain. No nausea, vomiting, or hematemesis; No diarrhea or constipation. No melena or hematochezia.  MUSCULOSKELETAL: No joint pain, swelling, erythema or warmth, no back pain  GENITOURINARY: No dysuria, frequency or hematuria, no suprapubic pain  NEUROLOGICAL: No numbness or weakness, no headache, no syncope, no gait abnormalities   SKIN: No itching, burning, rashes, or lesions   All other review of systems is negative unless indicated above.

## 2024-03-17 NOTE — ED PROVIDER NOTE - OBJECTIVE STATEMENT
74-year-old woman with PMH HTN, HLD, diabetes, presenting due to 3 weeks of worsening cough, shortness of breath with exertion, sputum production.  Patient was noted to be tachypneic and tachycardic on triage vitals.  Patient states that she went to see her PCP on Wednesday who gave her an albuterol pump and oral cough suppressants however it has not been helping her and they PCP recommended she come to the emergency room for chest x-ray and possibly a CAT scan.  Patient denies fevers, chills, nausea, vomiting, chest pain, abdominal pain.  Denies hemoptysis, recent travel, leg swelling, hormone use

## 2024-03-18 LAB
A1C WITH ESTIMATED AVERAGE GLUCOSE RESULT: 7 % — HIGH (ref 4–5.6)
ALBUMIN SERPL ELPH-MCNC: 3.9 G/DL — SIGNIFICANT CHANGE UP (ref 3.3–5)
ALP SERPL-CCNC: 76 U/L — SIGNIFICANT CHANGE UP (ref 40–120)
ALT FLD-CCNC: 40 U/L — HIGH (ref 4–33)
ANION GAP SERPL CALC-SCNC: 14 MMOL/L — SIGNIFICANT CHANGE UP (ref 7–14)
AST SERPL-CCNC: 44 U/L — HIGH (ref 4–32)
BILIRUB SERPL-MCNC: 0.4 MG/DL — SIGNIFICANT CHANGE UP (ref 0.2–1.2)
BUN SERPL-MCNC: 17 MG/DL — SIGNIFICANT CHANGE UP (ref 7–23)
CALCIUM SERPL-MCNC: 9.3 MG/DL — SIGNIFICANT CHANGE UP (ref 8.4–10.5)
CHLORIDE SERPL-SCNC: 102 MMOL/L — SIGNIFICANT CHANGE UP (ref 98–107)
CHOLEST SERPL-MCNC: 107 MG/DL — SIGNIFICANT CHANGE UP
CO2 SERPL-SCNC: 24 MMOL/L — SIGNIFICANT CHANGE UP (ref 22–31)
CREAT SERPL-MCNC: 0.73 MG/DL — SIGNIFICANT CHANGE UP (ref 0.5–1.3)
EGFR: 85 ML/MIN/1.73M2 — SIGNIFICANT CHANGE UP
ESTIMATED AVERAGE GLUCOSE: 154 — SIGNIFICANT CHANGE UP
GLUCOSE BLDC GLUCOMTR-MCNC: 183 MG/DL — HIGH (ref 70–99)
GLUCOSE BLDC GLUCOMTR-MCNC: 213 MG/DL — HIGH (ref 70–99)
GLUCOSE BLDC GLUCOMTR-MCNC: 257 MG/DL — HIGH (ref 70–99)
GLUCOSE SERPL-MCNC: 137 MG/DL — HIGH (ref 70–99)
HCV AB S/CO SERPL IA: 0.43 S/CO — SIGNIFICANT CHANGE UP (ref 0–0.99)
HCV AB SERPL-IMP: SIGNIFICANT CHANGE UP
HDLC SERPL-MCNC: 31 MG/DL — LOW
LACTATE SERPL-SCNC: 2.4 MMOL/L — HIGH (ref 0.5–2)
LIPID PNL WITH DIRECT LDL SERPL: 41 MG/DL — SIGNIFICANT CHANGE UP
NON HDL CHOLESTEROL: 76 MG/DL — SIGNIFICANT CHANGE UP
POTASSIUM SERPL-MCNC: 3.8 MMOL/L — SIGNIFICANT CHANGE UP (ref 3.5–5.3)
POTASSIUM SERPL-SCNC: 3.8 MMOL/L — SIGNIFICANT CHANGE UP (ref 3.5–5.3)
PROT SERPL-MCNC: 7.7 G/DL — SIGNIFICANT CHANGE UP (ref 6–8.3)
SODIUM SERPL-SCNC: 140 MMOL/L — SIGNIFICANT CHANGE UP (ref 135–145)
TRIGL SERPL-MCNC: 177 MG/DL — HIGH
TSH SERPL-MCNC: 2.69 UIU/ML — SIGNIFICANT CHANGE UP (ref 0.27–4.2)

## 2024-03-18 RX ORDER — BUDESONIDE AND FORMOTEROL FUMARATE DIHYDRATE 160; 4.5 UG/1; UG/1
2 AEROSOL RESPIRATORY (INHALATION)
Refills: 0 | Status: DISCONTINUED | OUTPATIENT
Start: 2024-03-18 | End: 2024-03-20

## 2024-03-18 RX ADMIN — INSULIN GLARGINE 50 UNIT(S): 100 INJECTION, SOLUTION SUBCUTANEOUS at 22:37

## 2024-03-18 RX ADMIN — Medication 15 UNIT(S): at 09:28

## 2024-03-18 RX ADMIN — Medication 25 MILLIGRAM(S): at 06:02

## 2024-03-18 RX ADMIN — PANTOPRAZOLE SODIUM 40 MILLIGRAM(S): 20 TABLET, DELAYED RELEASE ORAL at 06:01

## 2024-03-18 RX ADMIN — Medication 75 MILLIGRAM(S): at 18:01

## 2024-03-18 RX ADMIN — Medication 20 MILLIGRAM(S): at 14:23

## 2024-03-18 RX ADMIN — Medication 75 MILLIGRAM(S): at 06:01

## 2024-03-18 RX ADMIN — Medication 1 TABLET(S): at 18:01

## 2024-03-18 RX ADMIN — Medication 81 MILLIGRAM(S): at 11:31

## 2024-03-18 RX ADMIN — Medication 1: at 22:36

## 2024-03-18 RX ADMIN — Medication 25 MILLIGRAM(S): at 18:01

## 2024-03-18 RX ADMIN — SIMVASTATIN 20 MILLIGRAM(S): 20 TABLET, FILM COATED ORAL at 21:01

## 2024-03-18 RX ADMIN — APIXABAN 5 MILLIGRAM(S): 2.5 TABLET, FILM COATED ORAL at 06:02

## 2024-03-18 RX ADMIN — Medication 1: at 09:28

## 2024-03-18 RX ADMIN — ISOSORBIDE MONONITRATE 30 MILLIGRAM(S): 60 TABLET, EXTENDED RELEASE ORAL at 11:31

## 2024-03-18 RX ADMIN — Medication 15 UNIT(S): at 13:47

## 2024-03-18 RX ADMIN — Medication 2: at 17:04

## 2024-03-18 RX ADMIN — Medication 15 UNIT(S): at 17:04

## 2024-03-18 RX ADMIN — BUDESONIDE AND FORMOTEROL FUMARATE DIHYDRATE 2 PUFF(S): 160; 4.5 AEROSOL RESPIRATORY (INHALATION) at 20:59

## 2024-03-18 RX ADMIN — APIXABAN 5 MILLIGRAM(S): 2.5 TABLET, FILM COATED ORAL at 18:01

## 2024-03-18 RX ADMIN — Medication 1: at 13:46

## 2024-03-18 RX ADMIN — Medication 20 MILLIGRAM(S): at 21:01

## 2024-03-18 RX ADMIN — Medication 1 TABLET(S): at 06:01

## 2024-03-18 RX ADMIN — LOSARTAN POTASSIUM 100 MILLIGRAM(S): 100 TABLET, FILM COATED ORAL at 06:02

## 2024-03-18 NOTE — PATIENT PROFILE ADULT - FALL HARM RISK - HARM RISK INTERVENTIONS
[Fall prevention counseling provided] : Fall prevention counseling provided [Adequate lighting] : Adequate lighting [No throw rugs] : No throw rugs [Use proper foot wear] : Use proper foot wear [Use recommended devices] : Use recommended devices Assistance with ambulation/Assistance OOB with selected safe patient handling equipment/Communicate Risk of Fall with Harm to all staff/Discuss with provider need for PT consult/Monitor for mental status changes/Monitor gait and stability/Reinforce activity limits and safety measures with patient and family/Reorient to person, place and time as needed/Review medications for side effects contributing to fall risk/Sit up slowly, dangle for a short time, stand at bedside before walking/Tailored Fall Risk Interventions/Toileting schedule using arm’s reach rule for commode and bathroom/Use of alarms - bed, chair and/or voice tab/Visual Cue: Yellow wristband and red socks/Bed in lowest position, wheels locked, appropriate side rails in place/Call bell, personal items and telephone in reach/Instruct patient to call for assistance before getting out of bed or chair/Non-slip footwear when patient is out of bed/[place] to call system/Physically safe environment - no spills, clutter or unnecessary equipment/Purposeful Proactive Rounding/Room/bathroom lighting operational, light cord in reach

## 2024-03-18 NOTE — CONSULT NOTE ADULT - ASSESSMENT
75 y/o female with PMH HTN, IDDM, HLD, GERD, CAD (s/p 5 stents, most recent a few years ago, only on ASA now), presenting with worsening cough.     Cough started about 3 weeks ago, improved slightly after 2 weeks, and then acutely worsened on 3/16 overnight which prompted her to come to ED. The cough is productive of light-colored sputum. She has some mild SOB while she is coughing, but otherwise no SOB at rest. No CP. No sensation of palpitations. No other major symptoms.    Med list: Insulin, metformin, PPI, ASA 81, amlodipine 5, telmisartan 80, HCTZ 12.5, simvastatin 20, Imdur 30. Atenolol was recently stopped by outpatient cardiologist at [facility].     Cardiology consulted for AF with RVR. AF appears new, although her records are not here, but her medication list does not include AC.    Patient appears well, comfortable, speaking full sentences, mild cough during exam. Denies CP or SOB.    EKG shows AF with RVR to 130s, iRBBB, LAFB, and borderline LVH by AVL criteria.  Pro-BNP 300s, CE not ordered. Lactate 4.7 --> 5.3. Flu positive. VBG normal. Hgb 9 (similar to prior). WBC 8.5. Cr 0.66.  CXR shows b/l prominent bronchovascular marking c/w pulmonary vascular congestion.  CTA PE shows no PE, and possibly mild pulmonary edema vs. fibrotic process    No TTE in the system since 2009.     She was started on Eliquis and so far received 20mg IV dilt + 30mg PO dilt, as well as 40mg IV methylpred.      Impression/Recommendations:  - AF with RVR likely secondary to ongoing infectious process, which appears to be Flu +/- overlying bacterial PNA?  - Lactate elevation is concerning but does not correlate clinically, would repeat (possibly on ABG if still high)  - PNA w/u per primary team, would consider sending procalcitonin despite no significant WBC elevation  - Please order TTE  - CHADSVASC 6  - Agree with starting Eliquis 5mg BID  - Please start Lopressor 25mg BID and uptitrate as needed for goal HR <110  - Admit to tele  - Continue home meds including ASA81 and HTN meds as needed    Note incomplete until cosigned by attending.    Osman Jaquez, PGY-4  Cardiology Fellow    For all new consults  [email or web address]  Login: [email or web address]
73 yo F with h/o HTN, DM, HLD presenting with cough and shortness of breath x 2 weeks. Pt reports having a persistent cough that has gotten worse. Denies any fevers, no sick contacts or recent travel. Pt states she was given cough medicine and an inhaler by her pcp but this has not helped. In ED pt was found to be in A fib with RVR, HR in 130s. Denies h/o A fib. She denies any chest pain or palpitations, no orthopnea or leg swelling.   In ED pt was given Diltiazem, Eliquis, Imdur, Losartan, Solumedrol   VS:  129/68  131  98.1  18  95% on RA (17 Mar 2024 15:59):  son interpreted for me: :  she has wheezing ,  and has a fibrillation  : no sob: ;      Influenza: wheezing   HTN:  DM:  HLD    Influenza: wheezing   -CONT TAMILFU ;  -ADD STEROIDS AS SHE IS WHEEZING:  Add symbicort   -mantain o2 sao2 above 90% all the time  HTN:  -controlled  DM:  -monitor on steroids  HLD  -per PMD    d acp

## 2024-03-18 NOTE — CONSULT NOTE ADULT - SUBJECTIVE AND OBJECTIVE BOX
03-18-24 @ 12:31    Patient is a 74y old  Female who presents with a chief complaint of A fib with RVR (17 Mar 2024 15:59)      HPI:  75 yo F with h/o HTN, DM, HLD presenting with cough and shortness of breath x 2 weeks. Pt reports having a persistent cough that has gotten worse. Denies any fevers, no sick contacts or recent travel. Pt states she was given cough medicine and an inhaler by her pcp but this has not helped. In ED pt was found to be in A fib with RVR, HR in 130s. Denies h/o A fib. She denies any chest pain or palpitations, no orthopnea or leg swelling.     In ED pt was given Diltiazem, Eliquis, Imdur, Losartan, Solumedrol   VS:  129/68  131  98.1  18  95% on RA (17 Mar 2024 15:59):    son interpreted for me: :  she has wheezing ,  and has a fibrillation  : no sob:       ?FOLLOWING PRESENT  [x ] Hx of PE/DVT, [x ] Hx COPD, [x ] Hx of Asthma, x[ ] Hx of Hospitalization, [x ]  Hx of BiPAP/CPAP use, [ x] Hx of NARCISA    Allergies    IV Contrast (Other)    Intolerances        PAST MEDICAL & SURGICAL HISTORY:  Atherosclerosis of coronary artery  Coronary artery disease      Type 2 diabetes mellitus  Diabetes      Hypercholesterolemia  Hypercholesterolemia      Essential hypertension  Hypertension      Other postprocedural status  umbilical surgery      Other postprocedural status  for treatment of diverticulitis      Status post total hysterectomy  at 19 years old          FAMILY HISTORY:  Family history of cardiovascular disease  alive; 64 years old    Family history of ischemic heart disease   at 69 from MI        Social History: [ x ] TOBACCO                  [x  ] ETOH                                 [x  ] IVDA/DRUGS    REVIEW OF SYSTEMS      General:	x    Skin/Breast:x  	  Ophthalmologic:x  	  ENMT:	x    Respiratory and Thorax: no cough ; wheezing  	  Cardiovascular:	x    Gastrointestinal:	x    Genitourinary:	x    Musculoskeletal:	x    Neurological:	x    Psychiatric:	x    Hematology/Lymphatics:	x    Endocrine:	x    Allergic/Immunologic:x	    MEDICATIONS  (STANDING):  apixaban 5 milliGRAM(s) Oral every 12 hours  aspirin enteric coated 81 milliGRAM(s) Oral daily  calcium carbonate 1250 mG  + Vitamin D (OsCal 500 + D) 1 Tablet(s) Oral two times a day  dextrose 5%. 1000 milliLiter(s) (100 mL/Hr) IV Continuous <Continuous>  dextrose 5%. 1000 milliLiter(s) (50 mL/Hr) IV Continuous <Continuous>  dextrose 50% Injectable 25 Gram(s) IV Push once  dextrose 50% Injectable 25 Gram(s) IV Push once  dextrose 50% Injectable 12.5 Gram(s) IV Push once  glucagon  Injectable 1 milliGRAM(s) IntraMuscular once  insulin glargine Injectable (LANTUS) 50 Unit(s) SubCutaneous at bedtime  insulin lispro (ADMELOG) corrective regimen sliding scale   SubCutaneous at bedtime  insulin lispro (ADMELOG) corrective regimen sliding scale   SubCutaneous three times a day before meals  insulin lispro Injectable (ADMELOG) 15 Unit(s) SubCutaneous three times a day before meals  isosorbide   mononitrate ER Tablet (IMDUR) 30 milliGRAM(s) Oral daily  losartan 100 milliGRAM(s) Oral daily  metoprolol tartrate 25 milliGRAM(s) Oral two times a day  oseltamivir 75 milliGRAM(s) Oral two times a day  pantoprazole    Tablet 40 milliGRAM(s) Oral before breakfast  simvastatin 20 milliGRAM(s) Oral at bedtime    MEDICATIONS  (PRN):  dextrose Oral Gel 15 Gram(s) Oral once PRN Blood Glucose LESS THAN 70 milliGRAM(s)/deciliter  dextrose Oral Gel 15 Gram(s) Oral once PRN Blood Glucose LESS THAN 70 milliGRAM(s)/deciliter  guaiFENesin Oral Liquid (Sugar-Free) 200 milliGRAM(s) Oral every 6 hours PRN Cough  hydrocodone/homatropine Syrup 5 milliLiter(s) Oral every 6 hours PRN severe cough       Vital Signs Last 24 Hrs  T(C): 36.6 (18 Mar 2024 08:25), Max: 37 (17 Mar 2024 15:03)  T(F): 97.8 (18 Mar 2024 08:25), Max: 98.6 (17 Mar 2024 15:03)  HR: 79 (18 Mar 2024 08:25) (79 - 95)  BP: 137/76 (18 Mar 2024 08:25) (115/73 - 137/76)  BP(mean): --  RR: 20 (18 Mar 2024 08:25) (18 - 21)  SpO2: 98% (18 Mar 2024 08:25) (96% - 99%)    Parameters below as of 18 Mar 2024 08:25  Patient On (Oxygen Delivery Method): room air    Orthostatic VS          I&O's Summary      Physical Exam:   GENERAL: NAD, well-groomed, well-developed  HEENT: ELIEZER/   Atraumatic, Normocephalic  ENMT: No tonsillar erythema, exudates, or enlargement; Moist mucous membranes, Good dentition, No lesions  NECK: Supple, No JVD, Normal thyroid  CHEST/LUNG: wheezing+  CVS: Regular rate and rhythm; No murmurs, rubs, or gallops  GI: : Soft, Nontender, Nondistended; Bowel sounds present  NERVOUS SYSTEM:  Alert & Oriented X3  EXTREMITIES:  - edema  LYMPH: No lymphadenopathy noted  SKIN: No rashes or lesions  ENDOCRINOLOGY: No Thyromegaly  PSYCH: Appropriate    Labs:  -1.0<36<4>>115<<7.425>>-1.0<<3><<4><<5<<1159>>SARS-CoV-2: NotDetec (21 Oct 2023 15:56)                              9.0    8.52  )-----------( 223      ( 17 Mar 2024 01:31 )             30.0     03-18    140  |  102  |  17  ----------------------------<  137<H>  3.8   |  24  |  0.73  03-17    138  |  98  |  15  ----------------------------<  260<H>  4.4   |  22  |  0.71  03-17    133<L>  |  94<L>  |  17  ----------------------------<  114<H>  3.9   |  19<L>  |  0.66    Ca    9.3      18 Mar 2024 07:53  Ca    9.6      17 Mar 2024 15:15  Ca    9.8      17 Mar 2024 01:31    TPro  7.7  /  Alb  3.9  /  TBili  0.4  /  DBili  x   /  AST  44<H>  /  ALT  40<H>  /  AlkPhos  76  -18  TPro  7.9  /  Alb  4.1  /  TBili  0.4  /  DBili  x   /  AST  30  /  ALT  38<H>  /  AlkPhos  78    TPro  7.8  /  Alb  4.0  /  TBili  0.3  /  DBili  x   /  AST  44<H>  /  ALT  43<H>  /  AlkPhos  77      CAPILLARY BLOOD GLUCOSE      POCT Blood Glucose.: 169 mg/dL (18 Mar 2024 09:01)  POCT Blood Glucose.: 154 mg/dL (17 Mar 2024 21:39)  POCT Blood Glucose.: 214 mg/dL (17 Mar 2024 17:08)  POCT Blood Glucose.: 278 mg/dL (17 Mar 2024 14:57)  POCT Blood Glucose.: 263 mg/dL (17 Mar 2024 13:53)  POCT Blood Glucose.: 297 mg/dL (17 Mar 2024 13:25)    LIVER FUNCTIONS - ( 18 Mar 2024 07:53 )  Alb: 3.9 g/dL / Pro: 7.7 g/dL / ALK PHOS: 76 U/L / ALT: 40 U/L / AST: 44 U/L / GGT: x             Urinalysis Basic - ( 18 Mar 2024 07:53 )    Color: x / Appearance: x / SG: x / pH: x  Gluc: 137 mg/dL / Ketone: x  / Bili: x / Urobili: x   Blood: x / Protein: x / Nitrite: x   Leuk Esterase: x / RBC: x / WBC x   Sq Epi: x / Non Sq Epi: x / Bacteria: x      D DImerLactate, Blood: 2.4 mmol/L ( @ 07:53)  Lactate, Blood: 4.5 mmol/L ( @ 15:15)  Lactate, Blood: 5.3 mmol/L ( @ 06:45)    Procalcitonin, Serum: 0.09 ng/mL ( @ 15:15)      Studies  Chest X-RAY  CT SCAN Chest   CT Abdomen  Venous Dopplers: LE:   Others  rad< from: CT Angio Chest PE Protocol w/ IV Cont (24 @ 05:49) >  PROCEDURE DATE:  2024          INTERPRETATION:  CLINICAL INFORMATION: Shortness of breath and tachypnea,   tachycardia    COMPARISON: CT abdomen 10/12/2009.    CONTRAST/COMPLICATIONS:  IV Contrast: Omnipaque 350  49 cc administered   51 cc discarded  Oral Contrast: NONE  Complications: None reported at time of study completion    PROCEDURE:  CT Angiography of the Chest.  Sagittal and coronal reformats wereperformed as well as 3D (MIP)   reconstructions.    FINDINGS:    LUNGS AND AIRWAYS: Patent central airways.  There are diffuse mild   peripheral reticular markings which may represent a fibrotic process or   mild pulmonary edema. Correlate clinically.  PLEURA: No pleural effusion.  MEDIASTINUM AND CHRISTELLE: No lymphadenopathy.  VESSELS: No pulmonary embolism.  HEART: Heart size is normal. No pericardial effusion. Coronary   calcifications. Mitral valve annulus calcification  CHEST WALL AND LOWER NECK: Within normal limits.  VISUALIZED UPPER ABDOMEN: Left renal cyst. Small hiatal hernia. Right   lobe hepatic hypodensity too small to characterize.  BONES: Generative changes.    IMPRESSION:    No pulmonary embolism.      --- End of Report ---            OVI BARTON MD; Attending Radiologist  This document has been electronically signed. Mar 17 2024  6:02AM    < end of copied text >  < from: Xray Chest 2 Views PA/Lat (24 @ 00:43) >    ACC: [id number] EXAM:  XR CHEST PA LAT 2V   ORDERED BY: MARV GALEANO     PROCEDURE DATE:  2024          INTERPRETATION:  CLINICAL INDICATION: Shortness of breath    EXAM: PA and lateral views of the chest.    COMPARISON: Chest radiograph from 10/21/2023    FINDINGS:    Bilateral prominent bronchovascular markings.  There is no pleural effusion or pneumothorax.  The heart is not well evaluated in this position  The visualized osseous structures demonstrate no acute pathology.    IMPRESSION:  Bilateral prominent bronchovascular markings, consistent with pulmonary   vascular congestion.    --- End of Report ---          DIXIE CHANG MD; Resident Radiologist  This document has been electronically signed.  OVI ZAMBRANO MD; AttendingRadiologist  This document has been electronically signed. Mar 17 2024  8:49AM    < end of copied text >

## 2024-03-19 ENCOUNTER — RESULT REVIEW (OUTPATIENT)
Age: 74
End: 2024-03-19

## 2024-03-19 LAB
ALBUMIN SERPL ELPH-MCNC: 4.1 G/DL — SIGNIFICANT CHANGE UP (ref 3.3–5)
ALP SERPL-CCNC: 77 U/L — SIGNIFICANT CHANGE UP (ref 40–120)
ALT FLD-CCNC: 48 U/L — HIGH (ref 4–33)
ANION GAP SERPL CALC-SCNC: 15 MMOL/L — HIGH (ref 7–14)
AST SERPL-CCNC: 39 U/L — HIGH (ref 4–32)
BASOPHILS # BLD AUTO: 0.02 K/UL — SIGNIFICANT CHANGE UP (ref 0–0.2)
BASOPHILS NFR BLD AUTO: 0.2 % — SIGNIFICANT CHANGE UP (ref 0–2)
BILIRUB SERPL-MCNC: 0.3 MG/DL — SIGNIFICANT CHANGE UP (ref 0.2–1.2)
BUN SERPL-MCNC: 31 MG/DL — HIGH (ref 7–23)
CALCIUM SERPL-MCNC: 9.5 MG/DL — SIGNIFICANT CHANGE UP (ref 8.4–10.5)
CHLORIDE SERPL-SCNC: 102 MMOL/L — SIGNIFICANT CHANGE UP (ref 98–107)
CO2 SERPL-SCNC: 22 MMOL/L — SIGNIFICANT CHANGE UP (ref 22–31)
CREAT SERPL-MCNC: 0.73 MG/DL — SIGNIFICANT CHANGE UP (ref 0.5–1.3)
EGFR: 85 ML/MIN/1.73M2 — SIGNIFICANT CHANGE UP
EOSINOPHIL # BLD AUTO: 0 K/UL — SIGNIFICANT CHANGE UP (ref 0–0.5)
EOSINOPHIL NFR BLD AUTO: 0 % — SIGNIFICANT CHANGE UP (ref 0–6)
GLUCOSE BLDC GLUCOMTR-MCNC: 288 MG/DL — HIGH (ref 70–99)
GLUCOSE BLDC GLUCOMTR-MCNC: 301 MG/DL — HIGH (ref 70–99)
GLUCOSE BLDC GLUCOMTR-MCNC: 327 MG/DL — HIGH (ref 70–99)
GLUCOSE BLDC GLUCOMTR-MCNC: 329 MG/DL — HIGH (ref 70–99)
GLUCOSE BLDC GLUCOMTR-MCNC: 330 MG/DL — HIGH (ref 70–99)
GLUCOSE BLDC GLUCOMTR-MCNC: 363 MG/DL — HIGH (ref 70–99)
GLUCOSE BLDC GLUCOMTR-MCNC: 410 MG/DL — HIGH (ref 70–99)
GLUCOSE SERPL-MCNC: 281 MG/DL — HIGH (ref 70–99)
HCT VFR BLD CALC: 30.1 % — LOW (ref 34.5–45)
HGB BLD-MCNC: 9.2 G/DL — LOW (ref 11.5–15.5)
IANC: 6.35 K/UL — SIGNIFICANT CHANGE UP (ref 1.8–7.4)
IMM GRANULOCYTES NFR BLD AUTO: 0.8 % — SIGNIFICANT CHANGE UP (ref 0–0.9)
LACTATE SERPL-SCNC: 2.6 MMOL/L — HIGH (ref 0.5–2)
LYMPHOCYTES # BLD AUTO: 1.86 K/UL — SIGNIFICANT CHANGE UP (ref 1–3.3)
LYMPHOCYTES # BLD AUTO: 21.9 % — SIGNIFICANT CHANGE UP (ref 13–44)
MCHC RBC-ENTMCNC: 23.1 PG — LOW (ref 27–34)
MCHC RBC-ENTMCNC: 30.6 GM/DL — LOW (ref 32–36)
MCV RBC AUTO: 75.4 FL — LOW (ref 80–100)
MONOCYTES # BLD AUTO: 0.2 K/UL — SIGNIFICANT CHANGE UP (ref 0–0.9)
MONOCYTES NFR BLD AUTO: 2.4 % — SIGNIFICANT CHANGE UP (ref 2–14)
NEUTROPHILS # BLD AUTO: 6.35 K/UL — SIGNIFICANT CHANGE UP (ref 1.8–7.4)
NEUTROPHILS NFR BLD AUTO: 74.7 % — SIGNIFICANT CHANGE UP (ref 43–77)
NRBC # BLD: 0 /100 WBCS — SIGNIFICANT CHANGE UP (ref 0–0)
NRBC # FLD: 0 K/UL — SIGNIFICANT CHANGE UP (ref 0–0)
PLATELET # BLD AUTO: 239 K/UL — SIGNIFICANT CHANGE UP (ref 150–400)
POTASSIUM SERPL-MCNC: 4.2 MMOL/L — SIGNIFICANT CHANGE UP (ref 3.5–5.3)
POTASSIUM SERPL-SCNC: 4.2 MMOL/L — SIGNIFICANT CHANGE UP (ref 3.5–5.3)
PROT SERPL-MCNC: 7.7 G/DL — SIGNIFICANT CHANGE UP (ref 6–8.3)
RBC # BLD: 3.99 M/UL — SIGNIFICANT CHANGE UP (ref 3.8–5.2)
RBC # FLD: 18.9 % — HIGH (ref 10.3–14.5)
SODIUM SERPL-SCNC: 139 MMOL/L — SIGNIFICANT CHANGE UP (ref 135–145)
WBC # BLD: 8.5 K/UL — SIGNIFICANT CHANGE UP (ref 3.8–10.5)
WBC # FLD AUTO: 8.5 K/UL — SIGNIFICANT CHANGE UP (ref 3.8–10.5)

## 2024-03-19 PROCEDURE — 93306 TTE W/DOPPLER COMPLETE: CPT | Mod: 26

## 2024-03-19 PROCEDURE — 99222 1ST HOSP IP/OBS MODERATE 55: CPT

## 2024-03-19 RX ADMIN — APIXABAN 5 MILLIGRAM(S): 2.5 TABLET, FILM COATED ORAL at 05:07

## 2024-03-19 RX ADMIN — Medication 5: at 16:17

## 2024-03-19 RX ADMIN — Medication 15 UNIT(S): at 11:08

## 2024-03-19 RX ADMIN — Medication 3: at 08:10

## 2024-03-19 RX ADMIN — Medication 20 MILLIGRAM(S): at 06:24

## 2024-03-19 RX ADMIN — Medication 81 MILLIGRAM(S): at 12:02

## 2024-03-19 RX ADMIN — Medication 2: at 21:43

## 2024-03-19 RX ADMIN — Medication 20 MILLIGRAM(S): at 13:20

## 2024-03-19 RX ADMIN — Medication 200 MILLIGRAM(S): at 19:31

## 2024-03-19 RX ADMIN — BUDESONIDE AND FORMOTEROL FUMARATE DIHYDRATE 2 PUFF(S): 160; 4.5 AEROSOL RESPIRATORY (INHALATION) at 08:18

## 2024-03-19 RX ADMIN — INSULIN GLARGINE 50 UNIT(S): 100 INJECTION, SOLUTION SUBCUTANEOUS at 21:44

## 2024-03-19 RX ADMIN — SIMVASTATIN 20 MILLIGRAM(S): 20 TABLET, FILM COATED ORAL at 21:37

## 2024-03-19 RX ADMIN — Medication 75 MILLIGRAM(S): at 05:07

## 2024-03-19 RX ADMIN — PANTOPRAZOLE SODIUM 40 MILLIGRAM(S): 20 TABLET, DELAYED RELEASE ORAL at 05:07

## 2024-03-19 RX ADMIN — Medication 20 MILLIGRAM(S): at 21:29

## 2024-03-19 RX ADMIN — Medication 1 TABLET(S): at 17:48

## 2024-03-19 RX ADMIN — Medication 75 MILLIGRAM(S): at 19:55

## 2024-03-19 RX ADMIN — BUDESONIDE AND FORMOTEROL FUMARATE DIHYDRATE 2 PUFF(S): 160; 4.5 AEROSOL RESPIRATORY (INHALATION) at 21:29

## 2024-03-19 RX ADMIN — APIXABAN 5 MILLIGRAM(S): 2.5 TABLET, FILM COATED ORAL at 17:48

## 2024-03-19 RX ADMIN — Medication 25 MILLIGRAM(S): at 05:07

## 2024-03-19 RX ADMIN — Medication 15 UNIT(S): at 08:11

## 2024-03-19 RX ADMIN — LOSARTAN POTASSIUM 100 MILLIGRAM(S): 100 TABLET, FILM COATED ORAL at 05:07

## 2024-03-19 RX ADMIN — ISOSORBIDE MONONITRATE 30 MILLIGRAM(S): 60 TABLET, EXTENDED RELEASE ORAL at 12:03

## 2024-03-19 RX ADMIN — Medication 1 TABLET(S): at 05:07

## 2024-03-19 RX ADMIN — Medication 6: at 11:08

## 2024-03-19 RX ADMIN — Medication 15 UNIT(S): at 16:18

## 2024-03-19 RX ADMIN — Medication 25 MILLIGRAM(S): at 19:55

## 2024-03-20 ENCOUNTER — TRANSCRIPTION ENCOUNTER (OUTPATIENT)
Age: 74
End: 2024-03-20

## 2024-03-20 VITALS
TEMPERATURE: 99 F | DIASTOLIC BLOOD PRESSURE: 67 MMHG | OXYGEN SATURATION: 98 % | RESPIRATION RATE: 18 BRPM | SYSTOLIC BLOOD PRESSURE: 133 MMHG | HEART RATE: 84 BPM

## 2024-03-20 LAB
ANION GAP SERPL CALC-SCNC: 15 MMOL/L — HIGH (ref 7–14)
BUN SERPL-MCNC: 34 MG/DL — HIGH (ref 7–23)
CALCIUM SERPL-MCNC: 9.3 MG/DL — SIGNIFICANT CHANGE UP (ref 8.4–10.5)
CHLORIDE SERPL-SCNC: 104 MMOL/L — SIGNIFICANT CHANGE UP (ref 98–107)
CO2 SERPL-SCNC: 21 MMOL/L — LOW (ref 22–31)
CREAT SERPL-MCNC: 0.72 MG/DL — SIGNIFICANT CHANGE UP (ref 0.5–1.3)
EGFR: 87 ML/MIN/1.73M2 — SIGNIFICANT CHANGE UP
GLUCOSE BLDC GLUCOMTR-MCNC: 188 MG/DL — HIGH (ref 70–99)
GLUCOSE BLDC GLUCOMTR-MCNC: 319 MG/DL — HIGH (ref 70–99)
GLUCOSE SERPL-MCNC: 180 MG/DL — HIGH (ref 70–99)
HCT VFR BLD CALC: 29.2 % — LOW (ref 34.5–45)
HGB BLD-MCNC: 8.7 G/DL — LOW (ref 11.5–15.5)
MAGNESIUM SERPL-MCNC: 2.1 MG/DL — SIGNIFICANT CHANGE UP (ref 1.6–2.6)
MCHC RBC-ENTMCNC: 22.4 PG — LOW (ref 27–34)
MCHC RBC-ENTMCNC: 29.8 GM/DL — LOW (ref 32–36)
MCV RBC AUTO: 75.3 FL — LOW (ref 80–100)
NRBC # BLD: 0 /100 WBCS — SIGNIFICANT CHANGE UP (ref 0–0)
NRBC # FLD: 0 K/UL — SIGNIFICANT CHANGE UP (ref 0–0)
PHOSPHATE SERPL-MCNC: 3.3 MG/DL — SIGNIFICANT CHANGE UP (ref 2.5–4.5)
PLATELET # BLD AUTO: 262 K/UL — SIGNIFICANT CHANGE UP (ref 150–400)
POTASSIUM SERPL-MCNC: 3.9 MMOL/L — SIGNIFICANT CHANGE UP (ref 3.5–5.3)
POTASSIUM SERPL-SCNC: 3.9 MMOL/L — SIGNIFICANT CHANGE UP (ref 3.5–5.3)
RBC # BLD: 3.88 M/UL — SIGNIFICANT CHANGE UP (ref 3.8–5.2)
RBC # FLD: 19 % — HIGH (ref 10.3–14.5)
SODIUM SERPL-SCNC: 140 MMOL/L — SIGNIFICANT CHANGE UP (ref 135–145)
WBC # BLD: 11.46 K/UL — HIGH (ref 3.8–10.5)
WBC # FLD AUTO: 11.46 K/UL — HIGH (ref 3.8–10.5)

## 2024-03-20 RX ORDER — HYDROCHLOROTHIAZIDE 25 MG
1 TABLET ORAL
Refills: 0 | DISCHARGE

## 2024-03-20 RX ORDER — AMLODIPINE BESYLATE 2.5 MG/1
1 TABLET ORAL
Refills: 0 | DISCHARGE

## 2024-03-20 RX ORDER — LOSARTAN POTASSIUM 100 MG/1
1 TABLET, FILM COATED ORAL
Qty: 30 | Refills: 0
Start: 2024-03-20 | End: 2024-04-18

## 2024-03-20 RX ORDER — BUDESONIDE AND FORMOTEROL FUMARATE DIHYDRATE 160; 4.5 UG/1; UG/1
2 AEROSOL RESPIRATORY (INHALATION)
Qty: 1 | Refills: 0
Start: 2024-03-20 | End: 2024-04-18

## 2024-03-20 RX ORDER — TELMISARTAN 20 MG/1
1 TABLET ORAL
Refills: 0 | DISCHARGE

## 2024-03-20 RX ORDER — METOPROLOL TARTRATE 50 MG
1 TABLET ORAL
Qty: 60 | Refills: 0
Start: 2024-03-20 | End: 2024-04-18

## 2024-03-20 RX ORDER — ATENOLOL 25 MG/1
1 TABLET ORAL
Refills: 0 | DISCHARGE

## 2024-03-20 RX ORDER — APIXABAN 2.5 MG/1
1 TABLET, FILM COATED ORAL
Qty: 60 | Refills: 0
Start: 2024-03-20 | End: 2024-04-18

## 2024-03-20 RX ADMIN — Medication 15 UNIT(S): at 07:57

## 2024-03-20 RX ADMIN — PANTOPRAZOLE SODIUM 40 MILLIGRAM(S): 20 TABLET, DELAYED RELEASE ORAL at 05:54

## 2024-03-20 RX ADMIN — BUDESONIDE AND FORMOTEROL FUMARATE DIHYDRATE 2 PUFF(S): 160; 4.5 AEROSOL RESPIRATORY (INHALATION) at 08:34

## 2024-03-20 RX ADMIN — Medication 20 MILLIGRAM(S): at 05:52

## 2024-03-20 RX ADMIN — Medication 15 UNIT(S): at 12:34

## 2024-03-20 RX ADMIN — Medication 75 MILLIGRAM(S): at 05:53

## 2024-03-20 RX ADMIN — LOSARTAN POTASSIUM 100 MILLIGRAM(S): 100 TABLET, FILM COATED ORAL at 05:53

## 2024-03-20 RX ADMIN — Medication 1: at 07:56

## 2024-03-20 RX ADMIN — Medication 81 MILLIGRAM(S): at 16:06

## 2024-03-20 RX ADMIN — Medication 25 MILLIGRAM(S): at 05:53

## 2024-03-20 RX ADMIN — Medication 1 TABLET(S): at 05:53

## 2024-03-20 RX ADMIN — Medication 4: at 12:34

## 2024-03-20 RX ADMIN — ISOSORBIDE MONONITRATE 30 MILLIGRAM(S): 60 TABLET, EXTENDED RELEASE ORAL at 16:06

## 2024-03-20 RX ADMIN — APIXABAN 5 MILLIGRAM(S): 2.5 TABLET, FILM COATED ORAL at 05:53

## 2024-03-20 NOTE — DISCHARGE NOTE NURSING/CASE MANAGEMENT/SOCIAL WORK - NSDCPEFALRISK_GEN_ALL_CORE
For information on Fall & Injury Prevention, visit: [email or web address] OR  [email or web address] OR  https://www.cdc.gov/steadi/patient.html

## 2024-03-20 NOTE — DISCHARGE NOTE PROVIDER - PROVIDER TOKENS
PROVIDER:[TOKEN:[[id number]],FOLLOWUP:[2 weeks]],PROVIDER:[TOKEN:[[id number]],FOLLOWUP:[Routine]],PROVIDER:[TOKEN:[[id number]],FOLLOWUP:[1 month]]

## 2024-03-20 NOTE — DISCHARGE NOTE PROVIDER - HOSPITAL COURSE
Mrs. Trevino is a 75 yo F with h/o HTN, DM, HLD presenting with cough and shortness of breath x 2 weeks. In ED pt was found to be in A fib with RVR, HR in 130s. Pt was also found to be positive for Influenza A. Cardiology was consulted for A- Fib. Pt was started on Eliquis 5 mg PO BID and Lopressor 25 mg PO BID for rate control. TTE was performed which showed LVSF normal with an EF of 73%. Pt was also evaluated by Pulmonary during admission. She was started on IV Methylprednisolone and transitioned to PO steroids on 3/20/2024. Pt cleared for discharge from Cardiology and Pulmonary. Pt should follow up with her cardiologist for further Afib management on discharge. She was also referred to a Pulmonologist.     Discussed with Dr. Silverio, pt is medically cleared for discharge on 3/20/2024.

## 2024-03-20 NOTE — DISCHARGE NOTE PROVIDER - CARE PROVIDERS DIRECT ADDRESSES
,[email or web address],DirectAddress_Unknown,DirectAddress_Unknown

## 2024-03-20 NOTE — PHARMACOTHERAPY INTERVENTION NOTE - COMMENTS
Discharge medications reviewed with patient's son Timothy over the phone. Outpatient medication schedule was discussed in detail including: medication name, indication, dose, administration times, treatment duration, side effects, drug interactions, and special instructions. Reviewed signs and symptoms of bleeding with Eliquis and when to contact a provider. Patient questions and concerns were answered and addressed. Patient's son demonstrated understanding.     Miri Baez, PharmD, Clinical Pharmacy Specialist, [id number]

## 2024-03-20 NOTE — DISCHARGE NOTE PROVIDER - NSDCCPTREATMENT_GEN_ALL_CORE_FT
PRINCIPAL PROCEDURE  Procedure: Transthoracic echocardiography (TTE)  Findings and Treatment:    1. Left ventricular cavity is normal in size. Left ventricular wall thickness is normal. Left ventricular systolic function is normal with an ejection fraction of 73 % by Underwood's method of disks. There are no regional wall motion abnormalities seen.   2. Normal left ventricular diastolic function, with normal filling pressure.   3. Normal right ventricular cavity size, with normal wall thickness, and normal systolic function.   4. No significant valvular disease.   5. No pericardial effusion seen.

## 2024-03-20 NOTE — DISCHARGE NOTE NURSING/CASE MANAGEMENT/SOCIAL WORK - PATIENT PORTAL LINK FT
You can access the FollowMyHealth Patient Portal offered by [facility] by registering at the following website: [email or web address]. By joining [employer]’s FollowMyHealth portal, you will also be able to view your health information using other applications (apps) compatible with our system.

## 2024-03-20 NOTE — DISCHARGE NOTE PROVIDER - CARE PROVIDER_API CALL
ANUM WESLEY  [address] [place] [address]  Phone: [phone]  Fax: [phone]  Follow Up Time: 2 weeks    Emmy Maria  Internal Medicine  [address] [place] [address]  Phone: [phone]  Fax: [phone]  Follow Up Time: Routine    Danny Arreguin  Pulmonary Disease  [address] [place] [id number]  Phone: [phone]  Fax: [phone]  Follow Up Time: 1 month

## 2024-03-20 NOTE — DISCHARGE NOTE PROVIDER - NSDCMRMEDTOKEN_GEN_ALL_CORE_FT
apixaban 5 mg oral tablet: 1 tab(s) orally every 12 hours  aspirin 81 mg oral delayed release capsule: orally once a day  benzonatate 100 mg oral capsule: 1 cap(s) orally every 8 hours MDD: 600  budesonide-formoterol 160 mcg-4.5 mcg/inh inhalation aerosol: 2 puff(s) inhaled 2 times a day  guaiFENesin 100 mg/5 mL oral liquid: 10 milliliter(s) orally every 6 hours as needed for Cough  isosorbide mononitrate 30 mg oral tablet, extended release: 1 tab(s) orally once a day  Lantus 100 units/mL subcutaneous solution: 50 international unit(s) subcutaneous once a day (at bedtime)  loratadine 10 mg oral tablet: 1 tab(s) orally once a day  losartan 100 mg oral tablet: 1 tab(s) orally once a day  metFORMIN 1000 mg oral tablet: 1 tab(s) orally 2 times a day  metoprolol tartrate 25 mg oral tablet: 1 tab(s) orally 2 times a day  NovoLOG FlexPen 100 units/mL injectable solution: 20 international unit(s) injectable 2 times a day (with meals)  omeprazole 40 mg oral delayed release capsule: 1 cap(s) orally once a day  oseltamivir 75 mg oral capsule: 1 cap(s) orally 2 times a day  Oystercal-D 500 mg-3.125 mcg (125 intl units) oral tablet: 1 tab(s) orally 2 times a day  predniSONE 20 mg oral tablet: 2 tab(s) orally once a day  simvastatin 20 mg oral tablet: 1 tab(s) orally once a day

## 2024-03-20 NOTE — DISCHARGE NOTE PROVIDER - NSDCCPCAREPLAN_GEN_ALL_CORE_FT
PRINCIPAL DISCHARGE DIAGNOSIS  Diagnosis: Atrial fibrillation  Assessment and Plan of Treatment: You were found to have Atrial fibrillation, which is an abnormal heart rhythm. Atrial Fibrilaltion can cause clots to form.  It is possible this was exacerbated by the flu. You were evaluated by cardiology and started on Eliquis which is a blood thinner. You were also prescribed Metoprolol which is a medication that helps control you heart rate. Your echocardiogram was normal. Please continue to take these medications as prescribed and follow up with your cardiologist within 2 weeks of discharge. Blood thinners can cause easy bruising. However, if you notice abnormal bleeding such as bleeding from your gums or blood in the stool or urine, please call your doctor or return to the hospital.      SECONDARY DISCHARGE DIAGNOSES  Diagnosis: SOB (shortness of breath)  Assessment and Plan of Treatment: You presented to the hospital and tested positive for Influenza A. You were started on Tamiflu and will need to complete 2 more days of the medication on discharge. Your symptoms such as  shortness of breath and a cough, were treated with steroids and cough medicine. You will be discharged on oral steroids, an inhaler, tamiflu, and cough medicine. Please continue to take your medications as prescribed. Steroids can cause your blood sugar to be elevated. Please continue to take your diabetes medications as prescribed and to check your blood sugar.

## 2024-03-20 NOTE — PROGRESS NOTE ADULT - ASSESSMENT
73 yo F with h/o HTN, DM, HLD presenting with cough and shortness of breath x 2 weeks. Found to be flu positive. Also complicated by A fib with RVR        Problem/Plan - 1:  ·  Problem: Atrial fibrillation with RVR.   ·  Plan: - In setting of the flu  - CHADS VASC 6  - Cont Eliquis, metoprolol  - Monitor on tele   - TTE ordered  - Check TSH.     Problem/Plan - 2:  ·  Problem: Influenza A.   ·  Plan: - Continue Tamiflu   - Supportive care  - Droplet isolation.  - po prednisone  -  symbicort   - pulm eval appreciated     Problem/Plan - 3:  ·  Problem: HTN (hypertension).   ·  Plan: - Monitor BP   - Continue home  Telmisartan, Amlodipine, HCTZ and Imdur. Adding Metoprolol as per cardiology recs.     Problem/Plan - 4:  ·  Problem: DM (diabetes mellitus).   ·  Plan: - Pt takes Lantus 50u + Novolog 20u at home   - Check A1C 7.0  - Hold metformin.     Problem/Plan - 5:  ·  Problem: HLD (hyperlipidemia).   ·  Plan: - Continue statin.     Problem/Plan - 6:  ·  Problem: Need for prophylactic measure.   ·  Plan: - Pt on Eliquis.  d/c home  
75 yo F with h/o HTN, DM, HLD presenting with cough and shortness of breath x 2 weeks. Pt reports having a persistent cough that has gotten worse. Denies any fevers, no sick contacts or recent travel. Pt states she was given cough medicine and an inhaler by her pcp but this has not helped. In ED pt was found to be in A fib with RVR, HR in 130s. Denies h/o A fib. She denies any chest pain or palpitations, no orthopnea or leg swelling.   In ED pt was given Diltiazem, Eliquis, Imdur, Losartan, Solumedrol   VS:  129/68  131  98.1  18  95% on RA (17 Mar 2024 15:59):  son interpreted for me: :  she has wheezing ,  and has a fibrillation  : no sob: ;      Influenza: wheezing   HTN:  DM:  HLD    Influenza: wheezing   -CONT TAMILFU ;  -ADD STEROIDS AS SHE IS WHEEZING:  Add symbicort   -mantain o2 sao2 above 90% all the time  3/19:  -seems to be doing pretty good:  wheezing ismuch better:   -can change to po seroids in A m  HTN:  -controlled  DM:  -monitor on steroids  3/19: monitor and control on steroids   HLD  -per PMD    dw pt
73 y/o female with PMH HTN, IDDM, HLD, GERD, CAD (s/p 5 stents, most recent a few years ago, only on ASA now), presenting with worsening cough.   Cardiology consulted for new AF with RVR.    Impression/Recommendations:  - AF with RVR likely secondary to ongoing infectious process, which appears to be Flu +/- overlying bacterial PNA?  - PNA w/u per primary team, would consider sending procalcitonin despite no significant WBC elevation  - Follow up TTE, ordered  - CHADSVASC 6, should be on anticoagulation for CVA prevention unless otherwise contraindicated  - Eliquis 5mg BID  - Lopressor 25mg BID. Uptitrate as needed for goal HR <110  - Telemetry monitoring  - Continue home meds including ASA81 and HTN meds as needed  - Maintain K>4, Mg>2   - Cardiology to sign off. Please reconsult as needed. Patient should follow up with cardiology after DC for afib mgmt    Seth Noyola MD  Cardiology Fellow  All Cardiology service information can be found 24/7 on [email or web address], password: [email or web address]     ***Note not finalized until co-signed by attending*** 
73 yo F with h/o HTN, DM, HLD presenting with cough and shortness of breath x 2 weeks. Found to be flu positive. Also complicated by A fib with RVR        Problem/Plan - 1:  ·  Problem: Atrial fibrillation with RVR.   ·  Plan: - In setting of the flu  - CHADS VASC 6  - Cont Eliquis, metoprolol  - Monitor on tele   - TTE ordered  - Check TSH.     Problem/Plan - 2:  ·  Problem: Influenza A.   ·  Plan: - Continue Tamiflu   - Supportive care  - Droplet isolation.  - iv steroids  - Add symbicort   - pulm eval appreciated     Problem/Plan - 3:  ·  Problem: HTN (hypertension).   ·  Plan: - Monitor BP   - Continue home  Telmisartan, Amlodipine, HCTZ and Imdur. Adding Metoprolol as per cardiology recs.     Problem/Plan - 4:  ·  Problem: DM (diabetes mellitus).   ·  Plan: - Pt takes Lantus 50u + Novolog 20u at home   - Check A1C 7.0  - Hold metformin.     Problem/Plan - 5:  ·  Problem: HLD (hyperlipidemia).   ·  Plan: - Continue statin.     Problem/Plan - 6:  ·  Problem: Need for prophylactic measure.   ·  Plan: - Pt on Eliquis.  
75 yo F with h/o HTN, DM, HLD presenting with cough and shortness of breath x 2 weeks. Found to be flu positive. Also complicated by A fib with RVR        Problem/Plan - 1:  ·  Problem: Atrial fibrillation with RVR.   ·  Plan: - In setting of the flu  - CHADS VASC 6  - Cont Eliquis, metoprolol  - Monitor on tele   - TTE ordered  - Check TSH.     Problem/Plan - 2:  ·  Problem: Influenza A.   ·  Plan: - Continue Tamiflu   - Supportive care  - Droplet isolation.  - iv steroids  - Add symbicort   - pulm eval appreciated     Problem/Plan - 3:  ·  Problem: HTN (hypertension).   ·  Plan: - Monitor BP   - Continue home  Telmisartan, Amlodipine, HCTZ and Imdur. Adding Metoprolol as per cardiology recs.     Problem/Plan - 4:  ·  Problem: DM (diabetes mellitus).   ·  Plan: - Pt takes Lantus 50u + Novolog 20u at home   - Check A1C 7.0  - Hold metformin.     Problem/Plan - 5:  ·  Problem: HLD (hyperlipidemia).   ·  Plan: - Continue statin.     Problem/Plan - 6:  ·  Problem: Need for prophylactic measure.   ·  Plan: - Pt on Eliquis.  
75 yo F with h/o HTN, DM, HLD presenting with cough and shortness of breath x 2 weeks. Pt reports having a persistent cough that has gotten worse. Denies any fevers, no sick contacts or recent travel. Pt states she was given cough medicine and an inhaler by her pcp but this has not helped. In ED pt was found to be in A fib with RVR, HR in 130s. Denies h/o A fib. She denies any chest pain or palpitations, no orthopnea or leg swelling.   In ED pt was given Diltiazem, Eliquis, Imdur, Losartan, Solumedrol   VS:  129/68  131  98.1  18  95% on RA (17 Mar 2024 15:59):  son interpreted for me: :  she has wheezing ,  and has a fibrillation  : no sob: ;      Influenza: wheezing   HTN:  DM:  HLD    Influenza: wheezing   -CONT TAMILFU ;  -ADD STEROIDS AS SHE IS WHEEZING:  Add symbicort   -mantain o2 sao2 above 90% all the time  3/19:  -seems to be doing pretty good:  wheezing is much better:   -can change to po steroids in A m  3/20: she may had underlying ILD : pulm fibrosis?  must follow as an outpt with pulmonary  :  change to po steroids :   HTN:  -controlled  DM:  -monitor on steroids  3/19: monitor and control on steroids   3/20; change to po steroids   HLD  -per PMD    dw pt  and PMD:  dcplanning